# Patient Record
Sex: MALE | Race: WHITE | HISPANIC OR LATINO | Employment: UNEMPLOYED | URBAN - METROPOLITAN AREA
[De-identification: names, ages, dates, MRNs, and addresses within clinical notes are randomized per-mention and may not be internally consistent; named-entity substitution may affect disease eponyms.]

---

## 2024-06-24 ENCOUNTER — HOSPITAL ENCOUNTER (INPATIENT)
Facility: HOSPITAL | Age: 59
LOS: 1 days | Discharge: HOME | DRG: 948 | End: 2024-06-25
Attending: EMERGENCY MEDICINE | Admitting: EMERGENCY MEDICINE
Payer: MEDICAID

## 2024-06-24 DIAGNOSIS — C79.9 METASTATIC MALIGNANT NEOPLASM, UNSPECIFIED SITE: Primary | ICD-10-CM

## 2024-06-24 DIAGNOSIS — R06.02 SHORTNESS OF BREATH: ICD-10-CM

## 2024-06-24 DIAGNOSIS — R07.9 CHEST PAIN: ICD-10-CM

## 2024-06-24 PROBLEM — C78.7 METASTATIC COLON CANCER TO LIVER: Status: ACTIVE | Noted: 2024-06-24

## 2024-06-24 PROBLEM — C18.9 METASTATIC COLON CANCER TO LIVER: Status: ACTIVE | Noted: 2024-06-24

## 2024-06-24 PROBLEM — E87.5 HYPERKALEMIA: Status: ACTIVE | Noted: 2024-06-24

## 2024-06-24 LAB
ALBUMIN SERPL BCP-MCNC: 2.2 G/DL (ref 3.5–5.2)
ALP SERPL-CCNC: 1076 U/L (ref 55–135)
ALT SERPL W/O P-5'-P-CCNC: 103 U/L (ref 10–44)
AMMONIA PLAS-SCNC: 70 UMOL/L (ref 10–50)
ANION GAP SERPL CALC-SCNC: 12 MMOL/L (ref 8–16)
AST SERPL-CCNC: 112 U/L (ref 10–40)
BASOPHILS # BLD AUTO: 0.06 K/UL (ref 0–0.2)
BASOPHILS NFR BLD: 0.4 % (ref 0–1.9)
BILIRUB SERPL-MCNC: 7.1 MG/DL (ref 0.1–1)
BILIRUB UR QL STRIP: ABNORMAL
BNP SERPL-MCNC: 14 PG/ML (ref 0–99)
BUN SERPL-MCNC: 13 MG/DL (ref 6–20)
CALCIUM SERPL-MCNC: 9.4 MG/DL (ref 8.7–10.5)
CHLORIDE SERPL-SCNC: 92 MMOL/L (ref 95–110)
CLARITY UR REFRACT.AUTO: CLEAR
CO2 SERPL-SCNC: 27 MMOL/L (ref 23–29)
COLOR UR AUTO: YELLOW
CREAT SERPL-MCNC: 0.7 MG/DL (ref 0.5–1.4)
DIFFERENTIAL METHOD BLD: ABNORMAL
EOSINOPHIL # BLD AUTO: 0.5 K/UL (ref 0–0.5)
EOSINOPHIL NFR BLD: 3.2 % (ref 0–8)
ERYTHROCYTE [DISTWIDTH] IN BLOOD BY AUTOMATED COUNT: 18.5 % (ref 11.5–14.5)
EST. GFR  (NO RACE VARIABLE): >60 ML/MIN/1.73 M^2
GLUCOSE SERPL-MCNC: 146 MG/DL (ref 70–110)
GLUCOSE UR QL STRIP: NEGATIVE
HCT VFR BLD AUTO: 32.9 % (ref 40–54)
HCV AB SERPL QL IA: NORMAL
HGB BLD-MCNC: 10.9 G/DL (ref 14–18)
HGB UR QL STRIP: ABNORMAL
HIV 1+2 AB+HIV1 P24 AG SERPL QL IA: NORMAL
IMM GRANULOCYTES # BLD AUTO: 0.16 K/UL (ref 0–0.04)
IMM GRANULOCYTES NFR BLD AUTO: 1.1 % (ref 0–0.5)
INR PPP: 1.2 (ref 0.8–1.2)
KETONES UR QL STRIP: NEGATIVE
LEUKOCYTE ESTERASE UR QL STRIP: NEGATIVE
LIPASE SERPL-CCNC: 15 U/L (ref 4–60)
LYMPHOCYTES # BLD AUTO: 1.1 K/UL (ref 1–4.8)
LYMPHOCYTES NFR BLD: 7.8 % (ref 18–48)
MCH RBC QN AUTO: 27.5 PG (ref 27–31)
MCHC RBC AUTO-ENTMCNC: 33.1 G/DL (ref 32–36)
MCV RBC AUTO: 83 FL (ref 82–98)
MONOCYTES # BLD AUTO: 1.3 K/UL (ref 0.3–1)
MONOCYTES NFR BLD: 8.8 % (ref 4–15)
NEUTROPHILS # BLD AUTO: 11.5 K/UL (ref 1.8–7.7)
NEUTROPHILS NFR BLD: 78.7 % (ref 38–73)
NITRITE UR QL STRIP: NEGATIVE
NRBC BLD-RTO: 0 /100 WBC
OHS QRS DURATION: 80 MS
OHS QTC CALCULATION: 449 MS
PH UR STRIP: 6 [PH] (ref 5–8)
PLATELET # BLD AUTO: 402 K/UL (ref 150–450)
PMV BLD AUTO: 9.7 FL (ref 9.2–12.9)
POTASSIUM SERPL-SCNC: 5.2 MMOL/L (ref 3.5–5.1)
PROT SERPL-MCNC: 8.3 G/DL (ref 6–8.4)
PROT UR QL STRIP: ABNORMAL
PROTHROMBIN TIME: 12.9 SEC (ref 9–12.5)
RBC # BLD AUTO: 3.96 M/UL (ref 4.6–6.2)
SODIUM SERPL-SCNC: 131 MMOL/L (ref 136–145)
SP GR UR STRIP: 1.02 (ref 1–1.03)
TROPONIN I SERPL DL<=0.01 NG/ML-MCNC: <0.006 NG/ML (ref 0–0.03)
URN SPEC COLLECT METH UR: ABNORMAL
WBC # BLD AUTO: 14.55 K/UL (ref 3.9–12.7)

## 2024-06-24 PROCEDURE — 85610 PROTHROMBIN TIME: CPT | Performed by: EMERGENCY MEDICINE

## 2024-06-24 PROCEDURE — 85025 COMPLETE CBC W/AUTO DIFF WBC: CPT

## 2024-06-24 PROCEDURE — 84484 ASSAY OF TROPONIN QUANT: CPT

## 2024-06-24 PROCEDURE — 93005 ELECTROCARDIOGRAM TRACING: CPT

## 2024-06-24 PROCEDURE — 11000001 HC ACUTE MED/SURG PRIVATE ROOM

## 2024-06-24 PROCEDURE — 83880 ASSAY OF NATRIURETIC PEPTIDE: CPT

## 2024-06-24 PROCEDURE — 96374 THER/PROPH/DIAG INJ IV PUSH: CPT

## 2024-06-24 PROCEDURE — 25500020 PHARM REV CODE 255: Performed by: EMERGENCY MEDICINE

## 2024-06-24 PROCEDURE — 63600175 PHARM REV CODE 636 W HCPCS

## 2024-06-24 PROCEDURE — 25000003 PHARM REV CODE 250: Performed by: INTERNAL MEDICINE

## 2024-06-24 PROCEDURE — 21400001 HC TELEMETRY ROOM

## 2024-06-24 PROCEDURE — 87389 HIV-1 AG W/HIV-1&-2 AB AG IA: CPT | Performed by: PHYSICIAN ASSISTANT

## 2024-06-24 PROCEDURE — 99285 EMERGENCY DEPT VISIT HI MDM: CPT | Mod: 25

## 2024-06-24 PROCEDURE — 82140 ASSAY OF AMMONIA: CPT | Performed by: EMERGENCY MEDICINE

## 2024-06-24 PROCEDURE — 83690 ASSAY OF LIPASE: CPT | Performed by: EMERGENCY MEDICINE

## 2024-06-24 PROCEDURE — 96361 HYDRATE IV INFUSION ADD-ON: CPT

## 2024-06-24 PROCEDURE — 93010 ELECTROCARDIOGRAM REPORT: CPT | Mod: ,,, | Performed by: INTERNAL MEDICINE

## 2024-06-24 PROCEDURE — 86803 HEPATITIS C AB TEST: CPT | Performed by: PHYSICIAN ASSISTANT

## 2024-06-24 PROCEDURE — 80053 COMPREHEN METABOLIC PANEL: CPT

## 2024-06-24 PROCEDURE — 81003 URINALYSIS AUTO W/O SCOPE: CPT | Performed by: EMERGENCY MEDICINE

## 2024-06-24 RX ORDER — LOSARTAN POTASSIUM 25 MG/1
1 TABLET ORAL DAILY
Status: ON HOLD | COMMUNITY
Start: 2024-04-18 | End: 2024-06-25 | Stop reason: HOSPADM

## 2024-06-24 RX ORDER — OMEPRAZOLE 20 MG/1
CAPSULE, DELAYED RELEASE ORAL
COMMUNITY
Start: 2024-05-20

## 2024-06-24 RX ORDER — IBUPROFEN 200 MG
24 TABLET ORAL
Status: DISCONTINUED | OUTPATIENT
Start: 2024-06-24 | End: 2024-06-24

## 2024-06-24 RX ORDER — DEXTROSE 40 %
16 GEL (GRAM) ORAL
Status: DISCONTINUED | OUTPATIENT
Start: 2024-06-24 | End: 2024-06-25 | Stop reason: HOSPADM

## 2024-06-24 RX ORDER — GLUCAGON 1 MG
1 KIT INJECTION
Status: DISCONTINUED | OUTPATIENT
Start: 2024-06-24 | End: 2024-06-25 | Stop reason: HOSPADM

## 2024-06-24 RX ORDER — ERGOCALCIFEROL 1.25 MG/1
1 CAPSULE ORAL
COMMUNITY
Start: 2024-05-16

## 2024-06-24 RX ORDER — IBUPROFEN 200 MG
16 TABLET ORAL
Status: DISCONTINUED | OUTPATIENT
Start: 2024-06-24 | End: 2024-06-24

## 2024-06-24 RX ORDER — METFORMIN HYDROCHLORIDE 500 MG/1
500 TABLET ORAL 2 TIMES DAILY WITH MEALS
COMMUNITY
Start: 2024-05-16

## 2024-06-24 RX ORDER — METHYLPREDNISOLONE 4 MG/1
TABLET ORAL
COMMUNITY
Start: 2024-04-18 | End: 2024-06-24

## 2024-06-24 RX ORDER — MORPHINE SULFATE 15 MG/1
15 TABLET, FILM COATED, EXTENDED RELEASE ORAL 2 TIMES DAILY
Status: ON HOLD | COMMUNITY
Start: 2024-06-07 | End: 2024-06-25

## 2024-06-24 RX ORDER — ATORVASTATIN CALCIUM 10 MG/1
1 TABLET, FILM COATED ORAL DAILY
Status: ON HOLD | COMMUNITY
Start: 2024-04-18 | End: 2024-06-25 | Stop reason: HOSPADM

## 2024-06-24 RX ORDER — NALOXONE HCL 0.4 MG/ML
0.02 VIAL (ML) INJECTION
Status: DISCONTINUED | OUTPATIENT
Start: 2024-06-24 | End: 2024-06-25 | Stop reason: HOSPADM

## 2024-06-24 RX ORDER — MORPHINE SULFATE 15 MG/1
15 TABLET, FILM COATED, EXTENDED RELEASE ORAL 2 TIMES DAILY
Status: DISCONTINUED | OUTPATIENT
Start: 2024-06-24 | End: 2024-06-25 | Stop reason: HOSPADM

## 2024-06-24 RX ORDER — MORPHINE SULFATE 2 MG/ML
6 INJECTION, SOLUTION INTRAMUSCULAR; INTRAVENOUS
Status: COMPLETED | OUTPATIENT
Start: 2024-06-24 | End: 2024-06-24

## 2024-06-24 RX ORDER — CYCLOBENZAPRINE HYDROCHLORIDE 7.5 MG/1
TABLET, FILM COATED ORAL
COMMUNITY
Start: 2024-05-01

## 2024-06-24 RX ORDER — HYDROMORPHONE HYDROCHLORIDE 1 MG/ML
2 INJECTION, SOLUTION INTRAMUSCULAR; INTRAVENOUS; SUBCUTANEOUS EVERY 6 HOURS PRN
Status: DISCONTINUED | OUTPATIENT
Start: 2024-06-24 | End: 2024-06-25

## 2024-06-24 RX ORDER — OXYCODONE HYDROCHLORIDE 10 MG/1
10 TABLET ORAL EVERY 8 HOURS PRN
Status: ON HOLD | COMMUNITY
Start: 2024-06-06 | End: 2024-06-25

## 2024-06-24 RX ORDER — SODIUM CHLORIDE 0.9 % (FLUSH) 0.9 %
10 SYRINGE (ML) INJECTION EVERY 12 HOURS PRN
Status: DISCONTINUED | OUTPATIENT
Start: 2024-06-24 | End: 2024-06-25 | Stop reason: HOSPADM

## 2024-06-24 RX ORDER — DICYCLOMINE HYDROCHLORIDE 20 MG/1
TABLET ORAL
COMMUNITY
Start: 2024-05-01

## 2024-06-24 RX ORDER — ONDANSETRON HYDROCHLORIDE 2 MG/ML
4 INJECTION, SOLUTION INTRAVENOUS EVERY 8 HOURS PRN
Status: DISCONTINUED | OUTPATIENT
Start: 2024-06-24 | End: 2024-06-25 | Stop reason: HOSPADM

## 2024-06-24 RX ORDER — LIDOCAINE 50 MG/G
1 PATCH TOPICAL DAILY
COMMUNITY
Start: 2024-06-11

## 2024-06-24 RX ORDER — DEXTROSE 40 %
24 GEL (GRAM) ORAL
Status: DISCONTINUED | OUTPATIENT
Start: 2024-06-24 | End: 2024-06-25 | Stop reason: HOSPADM

## 2024-06-24 RX ADMIN — MORPHINE SULFATE 6 MG: 2 INJECTION, SOLUTION INTRAMUSCULAR; INTRAVENOUS at 08:06

## 2024-06-24 RX ADMIN — SODIUM CHLORIDE, POTASSIUM CHLORIDE, SODIUM LACTATE AND CALCIUM CHLORIDE 1000 ML: 600; 310; 30; 20 INJECTION, SOLUTION INTRAVENOUS at 08:06

## 2024-06-24 RX ADMIN — IOHEXOL 75 ML: 350 INJECTION, SOLUTION INTRAVENOUS at 09:06

## 2024-06-24 RX ADMIN — MORPHINE SULFATE 15 MG: 15 TABLET, EXTENDED RELEASE ORAL at 08:06

## 2024-06-24 NOTE — CONSULTS
[facility] Consult Note    Attending: Caleb Dougherty MD   Admit Date: 6/24/2024  Today's Date: 06/24/2024    SUBJECTIVE:     HPI:  58-year-old gentleman past medical history of recently diagnosed metastatic colon cancer who presents to [facility] for a second opinion for biliary obstruction from metastatic cancer. CT AP with contrast showed multiple large liver lesions, left sided IH ductal dilation, descending colonic wall thickening compatible with primary colonic mass. Initial labs - WBC 14k, Hb 10.9, INR 1.2, T bili 7.1, , , and ALP 1076.      A CT incidentally showed colon mass. CEA > 10,000. PET scan shows primary proximal left colonic neoplasm with possible additional synchronous neoplasm along the sigmoid colon versus metastatic sigmoid tissue deposits/implant as well as wide metastasis in his liver. Lymph nodes in mesentery. He underwent biopsy of the liver lesions which showed colonic adenocarcinoma. Received radiation therapy.  [place] [facility] [place]. MRCP showed did not show an area amenable to stenting. Seen by Heme-Onc who recommended hospice.     Most Recent Endoscopic Procedures:   None         All home medications reviewed.    Review of Systems   Constitutional:  Positive for weight loss. Negative for chills and fever.   Eyes:         Yellowing of eyes   Gastrointestinal:  Negative for abdominal pain and blood in stool.   Skin:         Yellowing       OBJECTIVE:     Vital Signs Trends/History Reviewed  Vitals:    06/24/24 0900 06/24/24 1030 06/24/24 1130 06/24/24 1200   BP: 124/65 123/79 131/75 (!) 129/92   BP Location: Left arm Left arm Left arm Left arm   Patient Position: Lying Lying Lying Lying   Pulse: (!) 117 (!) 118 108 (!) 112   Resp: 18 20 12 20   Temp:       TempSrc:       SpO2: 97% 97% 100% 100%       Physical Exam  Constitutional:       General: He is not in acute distress.     Appearance: He is ill-appearing.   HENT:       Head: Normocephalic and atraumatic.   Eyes:      General: Scleral icterus present.   Abdominal:      General: Abdomen is flat.      Palpations: Abdomen is soft.      Tenderness: There is no abdominal tenderness. There is no guarding or rebound.   Musculoskeletal:      Cervical back: Normal range of motion and neck supple.   Skin:     General: Skin is warm and dry.      Coloration: Skin is jaundiced.   Neurological:      General: No focal deficit present.      Mental Status: He is alert and oriented to person, place, and time. Mental status is at baseline.   Psychiatric:         Behavior: Behavior normal.         Thought Content: Thought content normal.         Judgment: Judgment normal.      Comments: Distraught          ASSESSMENT:      58-year-old gentleman past medical history of recently diagnosed metastatic colon cancer who presents to [facility] for a second opinion for biliary obstruction from metastatic cancer. CT AP with contrast showed multiple large liver lesions, left sided IH ductal dilation, descending colonic wall thickening compatible with primary colonic mass. Was deemed not to be a candidate for [place]CP [place] [facility] and patient initially went hospice.  AES consulted for possible ERCP with stenting.     #metastatic colon cancer with liver mets          RECOMMENDATIONS:    - Discussed the imaging findings with Dr. KAMRON Yang. I explained to patient and family.that most of his liver was replaced by mets, which was driving hyperbilirubinemia. Explained that ERCP w stenting unlikely to be of much benefit and carries the risk of infection. Ultimately, his prognosis is poor. The family and patient were distraught. But they accepted his poor prognosis and wished to speak to palliative care.  I communicated with the primary team, Oncology, and Palliative Care. Appreciate their recs.     Thank you for allowing us to participate in the care of this patient. Please call with questions. We will sign off.      Ru Herrera MD , PGY-V  Gastroenterology Fellow  [facility]

## 2024-06-24 NOTE — ASSESSMENT & PLAN NOTE
As mentioned above, laure came to [facility] for 2nd opinion though patient and relatives understands poor prognosis.  We will continue home pain meds, p.r.n. Dilaudid.  Spoke with patient and family, though probably not much to add, we will reach out to oncology

## 2024-06-24 NOTE — ED PROVIDER NOTES
Source of History:  Patient and chart    Chief complaint:  Multiple Complaints (Pt reports to the ED with c/o SOB, lower abdominal pain, N/V, generalized weakness x2 weeks. Pt reports colon CA, not currently on chemo. )      HPI:  Diego Mckeon is a 58 y.o. male with a medical history as below presents to the emergency department with a chief complaint of abdominal pain and shortness of breath secondary to metastatic colon cancer.  Patient has been being seen at [facility] where he was diagnosed with metastatic cancer and recommended to the urgent home hospice.  Family members wanted a 2nd opinion so they brought the patient to our facility pending further discussion with MD Navarrete who is considering the patient for treatment.  Patient was shortness of breath I am on over the last couple of nights.  It is nonexertional.  Abdominal pain she was more chronic in nature.  He was recently discharged after receiving MRCP at [facility] on the 20th.  He denies fever, chills, diarrhea, urinary symptoms, nausea or any other symptoms at this time.    Review of patient's allergies indicates:   Allergen Reactions    Asa [aspirin]        No current facility-administered medications on file prior to encounter.     Current Outpatient Medications on File Prior to Encounter   Medication Sig Dispense Refill    atorvastatin (LIPITOR) 10 MG tablet Take 1 tablet by mouth once daily.      cyclobenzaprine (FEXMID) 7.5 MG Tab TAKE 1 Tablet BY MOUTH THREE TIMES DAILY AS NEEDED FOR lower BACK pain      dicyclomine (BENTYL) 20 mg tablet TAKE 1 Tablet BY MOUTH FOUR TIMES DAILY FOR lower abdominal pain      ergocalciferol (ERGOCALCIFEROL) 50,000 unit Cap Take 1 capsule by mouth every 7 days. On Saturday.      LIDOcaine (LIDODERM) 5 % Place 1 patch onto the skin once daily. To lower back.      losartan (COZAAR) 25 MG tablet Take 1 tablet by mouth once daily.      metFORMIN (GLUCOPHAGE) 500 MG tablet Take 500 mg by mouth 2 (two) times daily with meals.       morphine (MS CONTIN) 15 MG 12 hr tablet Take 15 mg by mouth 2 (two) times daily.      omeprazole (PRILOSEC) 20 MG capsule TAKE 1 Capsule BY MOUTH EVERY DAY 30 minutes TO 1 hour BEFORE A MEAL      oxyCODONE (ROXICODONE) 10 mg Tab immediate release tablet Take 10 mg by mouth every 8 (eight) hours as needed for Pain.      hydrocortisone-pramoxine (PROCTOFOAM-HS) rectal foam Place 1 applicator rectally every 8 (eight) hours as needed. (Patient not taking: Reported on 6/24/2024)      [DISCONTINUED] methylPREDNISolone (MEDROL DOSEPACK) 4 mg tablet follow package directions         PMH:  As per HPI and below:  No past medical history on file.  No past surgical history on file.    Social History     Socioeconomic History    Marital status:      Social Determinants of Health     Food Insecurity: No Food Insecurity (6/20/2024)    Received [facility]    Hunger Vital Sign     Worried About Running Out of Food in the Last Year: Never true     Ran Out of Food in the Last Year: Never true   Transportation Needs: No Transportation Needs (6/20/2024)    [facility]     Lack of Transportation (Medical): No     Lack of Transportation (Non-Medical): No       No family history on file.    Physical Exam:      Vitals:    06/24/24 1200   BP: (!) 129/92   Pulse: (!) 112   Resp: 20   Temp:      Physical Exam  Gen:  Tachycardic but otherwise stable  Mental Status:  Alert and oriented x3.  Appropriate conversant  Skin: Warm, dry. No rashes seen.  Slight jaundice  Eyes: No conjunctival injection.  minimal scleral icterus  Pulm: CTAB. No increased work of breathing.  No significant tachypnea.  No conversational dyspnea.    CV: Regular rate.   Abd: Soft.  Not distended.  Nontender.   MSK: No deformities.  +1 pitting edema lower extremities bilaterally  Neuro: Awake. Speech normal. No focal neuro deficit observed.     Procedures  Laboratory Studies:  Labs Reviewed   CBC W/ AUTO DIFFERENTIAL  - Abnormal; Notable for the following components:       Result Value    WBC 14.55 (*)     RBC 3.96 (*)     Hemoglobin 10.9 (*)     Hematocrit 32.9 (*)     RDW 18.5 (*)     Immature Granulocytes 1.1 (*)     Gran # (ANC) 11.5 (*)     Immature Grans (Abs) 0.16 (*)     Mono # 1.3 (*)     Gran % 78.7 (*)     Lymph % 7.8 (*)     All other components within normal limits   COMPREHENSIVE METABOLIC PANEL - Abnormal; Notable for the following components:    Sodium 131 (*)     Potassium 5.2 (*)     Chloride 92 (*)     Glucose 146 (*)     Albumin 2.2 (*)     Total Bilirubin 7.1 (*)     Alkaline Phosphatase 1,076 (*)      (*)      (*)     All other components within normal limits   PROTIME-INR - Abnormal; Notable for the following components:    Prothrombin Time 12.9 (*)     All other components within normal limits   AMMONIA - Abnormal; Notable for the following components:    Ammonia 70 (*)     All other components within normal limits   URINALYSIS, REFLEX TO URINE CULTURE - Abnormal; Notable for the following components:    Protein, UA Trace (*)     Bilirubin (UA) 2+ (*)     Occult Blood UA Trace (*)     All other components within normal limits    Narrative:     Specimen Source->Urine   HIV 1 / 2 ANTIBODY    Narrative:     Release to patient->Immediate   HEPATITIS C ANTIBODY    Narrative:     Release to patient->Immediate   TROPONIN I   B-TYPE NATRIURETIC PEPTIDE   LIPASE       EKG (independently interpreted by me):  Sinus tachycardia with a rate of 117.  Right axis deviation.  Normal intervals.  No STEMI    X-rays (independently interpreted by me):  No apparent consolidation    Chart reviewed.  Patient was recently admitted to [facility] where he was sent by his oncologist concerns over biliary obstruction.  He was discharged to home hospice.    Imaging Results              X-Ray Chest AP Portable (Final result)  Result time 06/24/24 10:45:31      Final result by Andrea Celis MD (06/24/24 10:45:31)                    Impression:      See above      Electronically signed by: Andrea Celis MD  Date:    06/24/2024  Time:    10:45               Narrative:    EXAMINATION:  XR CHEST AP PORTABLE    CLINICAL HISTORY:  Chest Pain;    TECHNIQUE:  Single frontal view of the chest was performed.    COMPARISON:  N 06/24/2024 CT chest one    FINDINGS:  Central venous catheter in the SVC.  Heart size normal.  CT scan demonstrated multiple small pulmonary nodules.  These are not well demonstrated on this study.  The lungs are clear.  No pleural effusion.                                       CTA Chest Non-Coronary (PE Studies) (Final result)  Result time 06/24/24 10:40:02      Final result by Sonido Callahan IV, MD (06/24/24 10:40:02)                   Impression:      No convincing evidence of pulmonary thromboembolism.    Primary colonic mass in the descending colon with local extension of disease along the mesenteric root and surrounding peritoneal soft tissue nodules/tumor deposits.    Extensive hepatic metastasis noting areas of intrahepatic biliary duct dilatation, most prominent in the left hepatic lobe.  Additional probable upper abdominal metastatic lymph nodes.    Mesenteric edema and trace ascites.    Scattered subcentimeter pulmonary nodules, nonspecific.    Additional findings as above.      Electronically signed by: Sonido Callahan  Date:    06/24/2024  Time:    10:40               Narrative:    EXAMINATION:  CTA CHEST NON CORONARY (PE STUDIES); CT ABDOMEN PELVIS WITH IV CONTRAST    CLINICAL HISTORY:  Pulmonary embolism (PE) suspected, unknown D-dimer;Short of breath;; metastatic disease with abdominal pain;    TECHNIQUE:  CTA chest PE protocol and routine CT abdomen/pelvis performed after administration of 75 mL IV Omnipaque 350.  Coronal sagittal reformats provided.  MIPS also provided.    COMPARISON:  None    FINDINGS:  Right-sided chest port in place with catheter tip terminating at cavoatrial junction.  No  suspicious axillary lymphadenopathy.    Left-sided 3 vessel aortic arch.  No significant atherosclerotic plaque.  No aneurysm.    Heart is normal in size.  No significant pericardial fluid.    Pulmonary arteries and veins distribute normally.  Assessment of pulmonary arteries is degraded due to respiratory motion.  No convincing pulmonary arterial filling defect.    No suspicious mediastinal or hilar lymphadenopathy.    Large airways are patent.  Scattered bandlike opacities most compatible with subsegmental atelectasis.  Several scattered bilateral subcentimeter pulmonary nodules, largest measuring 6 mm in right middle lobe with abutment of pleura (series 2, image 254).    Liver is enlarged with numerous hypoattenuating masses throughout both lobes, compatible with reported metastatic colon cancer.  For example, lesion measuring 7.0 x 6.9 cm (series 3, image 72).    Gallbladder is nondistended.  No definite cholelithiasis.  Scattered areas of intrahepatic biliary duct dilatation, most prominent the left hepatic lobe, and likely related to obstructing masses.  No extrahepatic biliary duct dilatation.    Enlarged periportal lymph nodes with heterogeneous appearance.  For example lesion measures 1.7 cm in short axis (series 3, image 73).    Pancreas, spleen, bilateral adrenal glands are unremarkable.    Bowel kidneys are normal in size and location.  Normal symmetric uptake of contrast.  Subcentimeter hypodensity in right kidney, too small to characterize, likely a cyst.  No hydronephrosis or hydroureter.    Bladder is incompletely distended.  No focal wall thickening.  Prostate is upper limit of normal for size.    Stomach and duodenum are unremarkable.  Normal caliber small bowel.    Irregular wall thickening enhancement of descending colon, spanning 6 cm in craniocaudal dimension, compatible with primary colonic mass (series 3, image 83).  There is surrounding soft tissue nodularity suggesting local extension of  disease/metastasis.  This includes prominent 4.1 cm heterogeneous soft tissue deposit in the left anterolateral abdomen (series 3, image 105).  Nodular soft tissue extending for primary tumor along mesenteric root (series 3, image 89).  Additional prominent mesenteric soft tissue lesion measures 1.3 cm (series 3, image 81).    Moderate volume colonic stool burden without evidence of high-grade obstruction.  No free intraperitoneal air.  Mesenteric edema.  Trace ascites.    Abdominal aorta demonstrates normal caliber and course.  No significant atherosclerotic plaque.  Main portal vein, splenic vein, and SMV are patent.  Of note there is soft narrowing of main portal vein at the reese hepatis related to mass effect from caudate lesion.    No acute fracture.  No aggressive osseous lesion.                                       CT Abdomen Pelvis With IV Contrast NO Oral Contrast (Final result)  Result time 06/24/24 10:40:02      Final result by Sonido Callahan IV, MD (06/24/24 10:40:02)                   Impression:      No convincing evidence of pulmonary thromboembolism.    Primary colonic mass in the descending colon with local extension of disease along the mesenteric root and surrounding peritoneal soft tissue nodules/tumor deposits.    Extensive hepatic metastasis noting areas of intrahepatic biliary duct dilatation, most prominent in the left hepatic lobe.  Additional probable upper abdominal metastatic lymph nodes.    Mesenteric edema and trace ascites.    Scattered subcentimeter pulmonary nodules, nonspecific.    Additional findings as above.      Electronically signed by: Sonido Callahan  Date:    06/24/2024  Time:    10:40               Narrative:    EXAMINATION:  CTA CHEST NON CORONARY (PE STUDIES); CT ABDOMEN PELVIS WITH IV CONTRAST    CLINICAL HISTORY:  Pulmonary embolism (PE) suspected, unknown D-dimer;Short of breath;; metastatic disease with abdominal pain;    TECHNIQUE:  CTA chest PE protocol and  routine CT abdomen/pelvis performed after administration of 75 mL IV Omnipaque 350.  Coronal sagittal reformats provided.  MIPS also provided.    COMPARISON:  None    FINDINGS:  Right-sided chest port in place with catheter tip terminating at cavoatrial junction.  No suspicious axillary lymphadenopathy.    Left-sided 3 vessel aortic arch.  No significant atherosclerotic plaque.  No aneurysm.    Heart is normal in size.  No significant pericardial fluid.    Pulmonary arteries and veins distribute normally.  Assessment of pulmonary arteries is degraded due to respiratory motion.  No convincing pulmonary arterial filling defect.    No suspicious mediastinal or hilar lymphadenopathy.    Large airways are patent.  Scattered bandlike opacities most compatible with subsegmental atelectasis.  Several scattered bilateral subcentimeter pulmonary nodules, largest measuring 6 mm in right middle lobe with abutment of pleura (series 2, image 254).    Liver is enlarged with numerous hypoattenuating masses throughout both lobes, compatible with reported metastatic colon cancer.  For example, lesion measuring 7.0 x 6.9 cm (series 3, image 72).    Gallbladder is nondistended.  No definite cholelithiasis.  Scattered areas of intrahepatic biliary duct dilatation, most prominent the left hepatic lobe, and likely related to obstructing masses.  No extrahepatic biliary duct dilatation.    Enlarged periportal lymph nodes with heterogeneous appearance.  For example lesion measures 1.7 cm in short axis (series 3, image 73).    Pancreas, spleen, bilateral adrenal glands are unremarkable.    Bowel kidneys are normal in size and location.  Normal symmetric uptake of contrast.  Subcentimeter hypodensity in right kidney, too small to characterize, likely a cyst.  No hydronephrosis or hydroureter.    Bladder is incompletely distended.  No focal wall thickening.  Prostate is upper limit of normal for size.    Stomach and duodenum are unremarkable.   Normal caliber small bowel.    Irregular wall thickening enhancement of descending colon, spanning 6 cm in craniocaudal dimension, compatible with primary colonic mass (series 3, image 83).  There is surrounding soft tissue nodularity suggesting local extension of disease/metastasis.  This includes prominent 4.1 cm heterogeneous soft tissue deposit in the left anterolateral abdomen (series 3, image 105).  Nodular soft tissue extending for primary tumor along mesenteric root (series 3, image 89).  Additional prominent mesenteric soft tissue lesion measures 1.3 cm (series 3, image 81).    Moderate volume colonic stool burden without evidence of high-grade obstruction.  No free intraperitoneal air.  Mesenteric edema.  Trace ascites.    Abdominal aorta demonstrates normal caliber and course.  No significant atherosclerotic plaque.  Main portal vein, splenic vein, and SMV are patent.  Of note there is soft narrowing of main portal vein at the reese hepatis related to mass effect from caudate lesion.    No acute fracture.  No aggressive osseous lesion.                                      Medications Given:  Medications   sodium chloride 0.9% flush 10 mL (has no administration in time range)   naloxone 0.4 mg/mL injection 0.02 mg (has no administration in time range)   glucagon (human recombinant) injection 1 mg (has no administration in time range)   ondansetron injection 4 mg (has no administration in time range)   dextrose 10% bolus 125 mL 125 mL (has no administration in time range)   dextrose 10% bolus 250 mL 250 mL (has no administration in time range)   morphine 12 hr tablet 15 mg (has no administration in time range)   dextrose 40 % gel 16,000 mg (has no administration in time range)   dextrose 40 % gel 24,000 mg (has no administration in time range)   HYDROmorphone injection 2 mg (has no administration in time range)   morphine injection 6 mg (6 mg Intravenous Given 6/24/24 1184)   lactated ringers bolus 1,000 mL  (0 mLs Intravenous Stopped 6/24/24 1016)   iohexoL (OMNIPAQUE 350) injection 75 mL (75 mLs Intravenous Given 6/24/24 0984)       Discussed with:  Hospital medicine.  Sai AYALA  Nerves further management    MDM:    58 y.o. male with chest and abdominal pain this setting of metastatic colon cancer    Differential includes but is not limited to pneumonia, ACS, worsening metastatic disease    EKGs reassuring, troponin and BNP within normal limits.  I think it is unlikely to be ACS.    Lipase within normal limits.  I think it is unlikely to be pancreatitis.    Patient significant for abnormalities of liver enzymes, elevated ammonia, worsening coag factors.  This all consistent with metastatic disease.  Patient was anemic as leukocytosis.  Uncertain etiology of his elevated white blood cell count.    We admitted the patient to Hospital Medicine for further management the symptoms and 2nd opinion of his metastatic disease.          Medical Decision Making  Amount and/or Complexity of Data Reviewed  Labs: ordered. Decision-making details documented in ED Course.  Radiology: ordered.    Risk  Prescription drug management.  Decision regarding hospitalization.         Diagnostic Impression:    1. Metastatic malignant neoplasm, unspecified site    2. Shortness of breath    3. Chest pain         ED Disposition Condition    Admit Stable               Patient and/or family understands the plan and is in agreement, verbalized understanding, questions answered    V Jaylen Junior MD  Resident  Emergency Medicine         Paul Junior MD  Resident  06/24/24 2621    Agree with resident note and plan of care.  I independently reviewed the triage note, vitals, and all ordered labs/rads.  The resident and I discussed the case and disposition.           Valentine Ramsey MD  06/24/24 8297

## 2024-06-24 NOTE — SUBJECTIVE & OBJECTIVE
No past medical history on file.    No past surgical history on file.    Review of patient's allergies indicates:   Allergen Reactions    Asa [aspirin]        No current facility-administered medications on file prior to encounter.     Current Outpatient Medications on File Prior to Encounter   Medication Sig    atorvastatin (LIPITOR) 10 MG tablet Take 1 tablet by mouth once daily.    cyclobenzaprine (FEXMID) 7.5 MG Tab TAKE 1 Tablet BY MOUTH THREE TIMES DAILY AS NEEDED FOR lower BACK pain    dicyclomine (BENTYL) 20 mg tablet TAKE 1 Tablet BY MOUTH FOUR TIMES DAILY FOR lower abdominal pain    ergocalciferol (ERGOCALCIFEROL) 50,000 unit Cap Take 1 capsule by mouth every 7 days. On Saturday.    LIDOcaine (LIDODERM) 5 % Place 1 patch onto the skin once daily. To lower back.    losartan (COZAAR) 25 MG tablet Take 1 tablet by mouth once daily.    metFORMIN (GLUCOPHAGE) 500 MG tablet Take 500 mg by mouth 2 (two) times daily with meals.    morphine (MS CONTIN) 15 MG 12 hr tablet Take 15 mg by mouth 2 (two) times daily.    omeprazole (PRILOSEC) 20 MG capsule TAKE 1 Capsule BY MOUTH EVERY DAY 30 minutes TO 1 hour BEFORE A MEAL    oxyCODONE (ROXICODONE) 10 mg Tab immediate release tablet Take 10 mg by mouth every 8 (eight) hours as needed for Pain.    hydrocortisone-pramoxine (PROCTOFOAM-HS) rectal foam Place 1 applicator rectally every 8 (eight) hours as needed. (Patient not taking: Reported on 6/24/2024)    [DISCONTINUED] methylPREDNISolone (MEDROL DOSEPACK) 4 mg tablet follow package directions     Family History    None       Tobacco Use    Smoking status: Not on file    Smokeless tobacco: Not on file   Substance and Sexual Activity    Alcohol use: Not on file    Drug use: Not on file    Sexual activity: Not on file     Review of Systems   Respiratory:  Positive for shortness of breath.    Cardiovascular:  Negative for chest pain.   Gastrointestinal:  Positive for abdominal pain.   Skin:         Jaundice     Objective:      Vital Signs (Most Recent):  Temp: 98.4 °F (36.9 °C) (06/24/24 0634)  Pulse: (!) 112 (06/24/24 1200)  Resp: 20 (06/24/24 1200)  BP: (!) 129/92 (06/24/24 1200)  SpO2: 100 % (06/24/24 1200) Vital Signs (24h Range):  Temp:  [98.4 °F (36.9 °C)] 98.4 °F (36.9 °C)  Pulse:  [108-122] 112  Resp:  [12-20] 20  SpO2:  [96 %-100 %] 100 %  BP: (115-131)/(65-92) 129/92        There is no height or weight on file to calculate BMI.     Physical Exam  Constitutional:       Appearance: He is ill-appearing.   HENT:      Head: Normocephalic.      Right Ear: External ear normal.      Left Ear: External ear normal.      Nose: Nose normal.   Eyes:      General: Scleral icterus present.   Cardiovascular:      Rate and Rhythm: Normal rate.   Pulmonary:      Effort: Pulmonary effort is normal.   Abdominal:      Palpations: Abdomen is soft.      Tenderness: There is no abdominal tenderness.   Musculoskeletal:      Right lower leg: Edema present.      Left lower leg: Edema present.   Skin:     General: Skin is warm.   Neurological:      General: No focal deficit present.      Mental Status: He is alert and oriented to person, place, and time.                Significant Labs: All pertinent labs within the past 24 hours have been reviewed.    Significant Imaging: I have reviewed all pertinent imaging results/findings within the past 24 hours.

## 2024-06-24 NOTE — ED TRIAGE NOTES
Pt arrived via ambulance w Nephew. May 24th CT scan and diagnosed with metastasis colon cancer. Reoccurring abdominal pain. SOB started 7 hours ago. June 5th pet scan showed spots on the lymph node and liver. Endorse mid sternal chest pin while taking deep breaths

## 2024-06-24 NOTE — CARE UPDATE
Per chart review and discussion with Heme-Onc team here.  We will opt to get AES consult.  Heme-Onc will see him as an outpatient

## 2024-06-24 NOTE — HPI
58-year-old gentleman past medical history of recently diagnosed metastatic colon cancer who had care at [facility] and was discharged home on home hospice presented to [facility] for shortness of breath, abdominal pain and a 2nd opinion.  Per chart review, patient was seen by his hematologist oncologist at [facility] on 06/20 were discussions was made about risk of doing chemotherapy.  Spoke with patient, wife and nephew.  They were dissatisfied with the lack of/poor communication at [facility] and wanted a 2nd opinion at [facility] though they understand that patient has a poor prognosis.  Patient at this time has no complaints.  CT abdomen pelvis done showed evidence of metastatic colon cancer with now pulmonary nodules.  Potassium of 5.2

## 2024-06-24 NOTE — H&P
[facility] - Emergency Dept  [facility]  History & Physical    Patient Name: Diego Mckeon  MRN: [id number]  Patient Class: IP- Inpatient  Admission Date: 6/24/2024  Attending Physician: Caleb Dougherty MD   Primary Care Provider: No primary care provider on file.         Patient information was obtained from patient, relative(s), past medical records, and ER records.     Subjective:     Principal Problem:<principal problem not specified>    Chief Complaint:   Chief Complaint   Patient presents with    Multiple Complaints     Pt reports to the ED with c/o SOB, lower abdominal pain, N/V, generalized weakness x2 weeks. Pt reports colon CA, not currently on chemo.         HPI: 58-year-old gentleman past medical history of recently diagnosed metastatic colon cancer who had care at [facility] and was discharged home on home hospice presented to [facility] for shortness of breath, abdominal pain and a 2nd opinion.  Per chart review, patient was seen by his hematologist oncologist at [facility] on 06/20 were discussions was made about risk of doing chemotherapy.  Spoke with patient, wife and nephew.  They were dissatisfied with the lack of/poor communication at [facility] and wanted a 2nd opinion at [facility] though they understand that patient has a poor prognosis.  Patient at this time has no complaints.  CT abdomen pelvis done showed evidence of metastatic colon cancer with now pulmonary nodules.  Potassium of 5.2    No past medical history on file.    No past surgical history on file.    Review of patient's allergies indicates:   Allergen Reactions    Asa [aspirin]        No current facility-administered medications on file prior to encounter.     Current Outpatient Medications on File Prior to Encounter   Medication Sig    atorvastatin (LIPITOR) 10 MG tablet Take 1 tablet by mouth once daily.    cyclobenzaprine (FEXMID) 7.5 MG Tab TAKE 1 Tablet BY MOUTH THREE TIMES DAILY AS NEEDED FOR lower BACK pain    dicyclomine (BENTYL) 20 mg tablet  TAKE 1 Tablet BY MOUTH FOUR TIMES DAILY FOR lower abdominal pain    ergocalciferol (ERGOCALCIFEROL) 50,000 unit Cap Take 1 capsule by mouth every 7 days. On Saturday.    LIDOcaine (LIDODERM) 5 % Place 1 patch onto the skin once daily. To lower back.    losartan (COZAAR) 25 MG tablet Take 1 tablet by mouth once daily.    metFORMIN (GLUCOPHAGE) 500 MG tablet Take 500 mg by mouth 2 (two) times daily with meals.    morphine (MS CONTIN) 15 MG 12 hr tablet Take 15 mg by mouth 2 (two) times daily.    omeprazole (PRILOSEC) 20 MG capsule TAKE 1 Capsule BY MOUTH EVERY DAY 30 minutes TO 1 hour BEFORE A MEAL    oxyCODONE (ROXICODONE) 10 mg Tab immediate release tablet Take 10 mg by mouth every 8 (eight) hours as needed for Pain.    hydrocortisone-pramoxine (PROCTOFOAM-HS) rectal foam Place 1 applicator rectally every 8 (eight) hours as needed. (Patient not taking: Reported on 6/24/2024)    [DISCONTINUED] methylPREDNISolone (MEDROL DOSEPACK) 4 mg tablet follow package directions     Family History    None       Tobacco Use    Smoking status: Not on file    Smokeless tobacco: Not on file   Substance and Sexual Activity    Alcohol use: Not on file    Drug use: Not on file    Sexual activity: Not on file     Review of Systems   Respiratory:  Positive for shortness of breath.    Cardiovascular:  Negative for chest pain.   Gastrointestinal:  Positive for abdominal pain.   Skin:         Jaundice     Objective:     Vital Signs (Most Recent):  Temp: 98.4 °F (36.9 °C) (06/24/24 0634)  Pulse: (!) 112 (06/24/24 1200)  Resp: 20 (06/24/24 1200)  BP: (!) 129/92 (06/24/24 1200)  SpO2: 100 % (06/24/24 1200) Vital Signs (24h Range):  Temp:  [98.4 °F (36.9 °C)] 98.4 °F (36.9 °C)  Pulse:  [108-122] 112  Resp:  [12-20] 20  SpO2:  [96 %-100 %] 100 %  BP: (115-131)/(65-92) 129/92        There is no height or weight on file to calculate BMI.     Physical Exam  Constitutional:       Appearance: He is ill-appearing.   HENT:      Head: Normocephalic.       Right Ear: External ear normal.      Left Ear: External ear normal.      Nose: Nose normal.   Eyes:      General: Scleral icterus present.   Cardiovascular:      Rate and Rhythm: Normal rate.   Pulmonary:      Effort: Pulmonary effort is normal.   Abdominal:      Palpations: Abdomen is soft.      Tenderness: There is no abdominal tenderness.   Musculoskeletal:      Right lower leg: Edema present.      Left lower leg: Edema present.   Skin:     General: Skin is warm.   Neurological:      General: No focal deficit present.      Mental Status: He is alert and oriented to person, place, and time.                Significant Labs: All pertinent labs within the past 24 hours have been reviewed.    Significant Imaging: I have reviewed all pertinent imaging results/findings within the past 24 hours.  Assessment/Plan:     Hyperkalemia  Potassium of 5.2, we will hold home losartan as blood pressures have been fine            Metastatic colon cancer to liver  As mentioned above, laure came to [facility] for 2nd opinion though patient and relatives understands poor prognosis.  We will continue home pain meds, p.r.n. Dilaudid.  Spoke with patient and family, though probably not much to add, we will reach out to oncology      VTE Risk Mitigation (From admission, onward)           Ordered     IP VTE LOW RISK PATIENT  Once         06/24/24 1221     Place sequential compression device  Until discontinued         06/24/24 1221                                    Caleb Dougherty MD  Department of Hospital Medicine  [facility] - Emergency Dept

## 2024-06-24 NOTE — PHARMACY MED REC
"      Admission Medication History     The home medication history was taken by Jj Winters.    You may go to "Admission" then "Reconcile Home Medications" tabs to review and/or act upon these items.     The home medication list has been updated by the Pharmacy department.   Please read ALL comments highlighted in yellow.   Please address this information as you see fit.    Feel free to contact us if you have any questions or require assistance.      The medications listed below were removed from the home medication list. Please reorder if appropriate:  Patient reports no longer taking the following medication(s):  METHYLPREDNISOLONE 4 MG DOSE PACK TABLET    Medications listed below were obtained from: Patient/family and Analytic software- [employer]  Current Outpatient Medications on File Prior to Encounter   Medication Sig    atorvastatin (LIPITOR) 10 MG tablet   Take 1 tablet by mouth once daily.    cyclobenzaprine (FEXMID) 7.5 MG Tab   Take 1 tablet by mouth three times daily as needed for lower back pain.    dicyclomine (BENTYL) 20 mg tablet   Take 1 tablet by mouth four times daily for lower abdominal pain.    ergocalciferol (ERGOCALCIFEROL) 50,000 unit Cap   Take 1 capsule by mouth every 7 days on Saturday.    LIDOcaine (LIDODERM) 5 %     Place 1 patch onto the skin once daily for lower back pain.    losartan (COZAAR) 25 MG tablet   Take 1 tablet by mouth once daily.    metFORMIN (GLUCOPHAGE) 500 MG tablet   Take 500 mg by mouth 2 (two) times daily with meals.    morphine (MS CONTIN) 15 MG 12 hr tablet   Take 15 mg by mouth 2 (two) times daily.    omeprazole (PRILOSEC) 20 MG capsule   Take 1 capsule by mouth once daily.    oxyCODONE (ROXICODONE) 10 mg Tab immediate release tablet   Take 10 mg by mouth every 8 (eight) hours as needed for pain.    hydrocortisone-pramoxine (PROCTOFOAM-HS) rectal foam   Place 1 applicator rectally every 8 (eight) hours as needed for hemorrhoidal pain.   (Patient not taking: " Reported on 6/24/2024)         Potential issues to be addressed PRIOR TO DISCHARGE  Patient requires education regarding drug therapies     Jj Winters  EXT [id number]            .

## 2024-06-24 NOTE — Clinical Note
Diagnosis: Shortness of breath [786.05.ICD-9-CM]   Future Attending Provider: FLORI DUBON [[id number]]   Reason for IP Medical Treatment  (Clinical interventions that can only be accomplished in the IP setting? ) :: metastatic disease and lab abnormalities   I certify that Inpatient services for greater than or equal to 2 midnights are medically necessary:: Yes   Plans for Post-Acute care--if anticipated (pick the single best option):: A. No post acute care anticipated at this time

## 2024-06-25 VITALS
OXYGEN SATURATION: 98 % | SYSTOLIC BLOOD PRESSURE: 109 MMHG | DIASTOLIC BLOOD PRESSURE: 76 MMHG | HEART RATE: 115 BPM | RESPIRATION RATE: 18 BRPM | TEMPERATURE: 98 F

## 2024-06-25 PROBLEM — Z51.5 PALLIATIVE CARE ENCOUNTER: Status: ACTIVE | Noted: 2024-06-25

## 2024-06-25 LAB
ANION GAP SERPL CALC-SCNC: 14 MMOL/L (ref 8–16)
BUN SERPL-MCNC: 14 MG/DL (ref 6–20)
CALCIUM SERPL-MCNC: 8.8 MG/DL (ref 8.7–10.5)
CHLORIDE SERPL-SCNC: 95 MMOL/L (ref 95–110)
CO2 SERPL-SCNC: 22 MMOL/L (ref 23–29)
CREAT SERPL-MCNC: 0.7 MG/DL (ref 0.5–1.4)
EST. GFR  (NO RACE VARIABLE): >60 ML/MIN/1.73 M^2
GLUCOSE SERPL-MCNC: 156 MG/DL (ref 70–110)
POTASSIUM SERPL-SCNC: 5.4 MMOL/L (ref 3.5–5.1)
SODIUM SERPL-SCNC: 131 MMOL/L (ref 136–145)

## 2024-06-25 PROCEDURE — 99223 1ST HOSP IP/OBS HIGH 75: CPT | Mod: ,,, | Performed by: HOSPITALIST

## 2024-06-25 PROCEDURE — 99223 1ST HOSP IP/OBS HIGH 75: CPT | Mod: ,,, | Performed by: INTERNAL MEDICINE

## 2024-06-25 PROCEDURE — 80048 BASIC METABOLIC PNL TOTAL CA: CPT | Performed by: INTERNAL MEDICINE

## 2024-06-25 PROCEDURE — 36415 COLL VENOUS BLD VENIPUNCTURE: CPT | Performed by: INTERNAL MEDICINE

## 2024-06-25 PROCEDURE — 25000003 PHARM REV CODE 250: Performed by: INTERNAL MEDICINE

## 2024-06-25 PROCEDURE — 94761 N-INVAS EAR/PLS OXIMETRY MLT: CPT

## 2024-06-25 RX ORDER — OXYCODONE HYDROCHLORIDE 10 MG/1
10 TABLET ORAL
Status: DISCONTINUED | OUTPATIENT
Start: 2024-06-25 | End: 2024-06-25 | Stop reason: HOSPADM

## 2024-06-25 RX ORDER — HYDROMORPHONE HYDROCHLORIDE 1 MG/ML
1 INJECTION, SOLUTION INTRAMUSCULAR; INTRAVENOUS; SUBCUTANEOUS EVERY 4 HOURS PRN
Status: DISCONTINUED | OUTPATIENT
Start: 2024-06-25 | End: 2024-06-25 | Stop reason: HOSPADM

## 2024-06-25 RX ORDER — SENNOSIDES 8.6 MG/1
17.2 TABLET ORAL NIGHTLY
Status: DISCONTINUED | OUTPATIENT
Start: 2024-06-25 | End: 2024-06-25 | Stop reason: HOSPADM

## 2024-06-25 RX ORDER — MORPHINE SULFATE 15 MG/1
15 TABLET, FILM COATED, EXTENDED RELEASE ORAL 2 TIMES DAILY
Qty: 14 TABLET | Refills: 0 | Status: SHIPPED | OUTPATIENT
Start: 2024-06-25 | End: 2024-06-25

## 2024-06-25 RX ORDER — OXYCODONE HYDROCHLORIDE 10 MG/1
10 TABLET ORAL EVERY 4 HOURS PRN
Qty: 42 TABLET | Refills: 0 | Status: SHIPPED | OUTPATIENT
Start: 2024-06-25 | End: 2024-07-02 | Stop reason: SDUPTHER

## 2024-06-25 RX ORDER — OXYCODONE HYDROCHLORIDE 10 MG/1
10 TABLET ORAL EVERY 4 HOURS PRN
Qty: 42 TABLET | Refills: 0 | Status: SHIPPED | OUTPATIENT
Start: 2024-06-25 | End: 2024-06-25

## 2024-06-25 RX ORDER — MORPHINE SULFATE 15 MG/1
15 TABLET, FILM COATED, EXTENDED RELEASE ORAL 2 TIMES DAILY
Qty: 14 TABLET | Refills: 0 | Status: SHIPPED | OUTPATIENT
Start: 2024-06-25 | End: 2024-07-02 | Stop reason: SDUPTHER

## 2024-06-25 RX ADMIN — MORPHINE SULFATE 15 MG: 15 TABLET, EXTENDED RELEASE ORAL at 08:06

## 2024-06-25 RX ADMIN — OXYCODONE HYDROCHLORIDE 10 MG: 10 TABLET ORAL at 01:06

## 2024-06-25 RX ADMIN — OXYCODONE HYDROCHLORIDE 10 MG: 10 TABLET ORAL at 05:06

## 2024-06-25 NOTE — CONSULTS
Hematology Oncology Consult Note    Inpatient consult to Hematology/Oncology  Consult performed by: Gideon Siddiqui MD  Consult ordered by: Caleb Dougherty MD        SUBJECTIVE:     History of Present Illness:  Patient is a 58 y.o. male with a PMH of metastatic colorectal carcinoma with metastasis to the liver and lymph  nodes who follows with Dr. Lockett at [facility] who is here abdominal pain and generalized weakness and nausea and vomiting.  CTA chest was negative for PE. CT A/P with Primary colonic mass in the descending colon with local extension of disease along the mesenteric root and surrounding peritoneal soft tissue nodules/tumor deposits.  Extensive hepatic metastasis noting areas of intrahepatic biliary duct dilatation, most prominent in the left hepatic lobe. Additional probable upper abdominal metastatic lymph nodes. Mesenteric edema and trace ascites. Scattered subcentimeter pulmonary nodules, nonspecific.    Medical oncology consulted for metastatic colon cancer.    Today, he remains tachycardic however hemodynamically stable. CBC with leukocytosis of neutrophilic predominance, anemia with Hb of 10.9, CMP with hyponatremia, mild hyperkalemia, hyperbilirubinemia, elevated liver enzymes.  He was evaluated by gastroenterology/AES given elevated T.bili. Felt hyperbilirubinemia is secondary to intrahepatic tumor volume with no areas for interventions. He was seen by his primary outpatient oncologist on 06/21. Per oncologist note, the plan is for palliative chemotherapy with FOLFOX.      Review of patient's allergies indicates:   Allergen Reactions    Asa [aspirin]      No past medical history on file.  No past surgical history on file.  No family history on file.     Review of Systems   HENT: Negative.     Respiratory: Negative.     Cardiovascular: Negative.    Genitourinary: Negative.    Musculoskeletal: Negative.    Neurological: Negative.      OBJECTIVE:     Vital Signs:  Temp:  [98.3 °F (36.8 °C)-98.8 °F  (37.1 °C)]   Pulse:  [101-117]   Resp:  [16-19]   BP: (118-129)/(76-82)   SpO2:  [97 %-98 %]     Physical Exam  Vitals reviewed.   Constitutional:       General: He is not in acute distress.     Appearance: He is not toxic-appearing.   HENT:      Head: Normocephalic and atraumatic.   Eyes:      General: No scleral icterus.     Conjunctiva/sclera: Conjunctivae normal.   Cardiovascular:      Rate and Rhythm: Normal rate.      Pulses: Normal pulses.   Pulmonary:      Effort: Pulmonary effort is normal. No respiratory distress.      Breath sounds: Normal breath sounds. No rales.   Abdominal:      General: Bowel sounds are normal. There is no distension.      Palpations: Abdomen is soft.      Tenderness: There is no abdominal tenderness.   Musculoskeletal:      Right lower leg: No edema.      Left lower leg: No edema.   Skin:     Coloration: Skin is jaundiced. Skin is not pale.   Neurological:      Mental Status: He is alert and oriented to person, place, and time. Mental status is at baseline.   Psychiatric:         Mood and Affect: Mood normal.         Behavior: Behavior normal.       Laboratory:  I have reviewed all pertinent lab results within the past 24 hours.  CBC:   Recent Labs   Lab 06/24/24  0839   WBC 14.55*   RBC 3.96*   HGB 10.9*   HCT 32.9*      MCV 83   MCH 27.5   MCHC 33.1     CMP:   Recent Labs   Lab 06/24/24  0839   *   CALCIUM 9.4   ALBUMIN 2.2*   PROT 8.3   *   K 5.2*   CO2 27   CL 92*   BUN 13   CREATININE 0.7   ALKPHOS 1,076*   *   *   BILITOT 7.1*       Diagnostic Results:  Labs: Reviewed  US: Reviewed    ASSESSMENT/PLAN:     59 yo male with newly diagnosed metastatic colorectal carcinoma to the liver, lymph nodes and potentially lungs. He is [place] [facility] and follows with Dr. Lockett. He was seen on  6/21 and offered palliative FOLFOX however he opted for home hospice without chemotherapy. He is here for chest pain evaluation.    Had a long conversation with  his  nephew, Kye yesterday regarding patient's cancer and prognosis and treatment plan of his treating physician. He was also evaluated by AES, hyperbilirubinemia not amenable to stenting.  Given that hyperbilirubinemia is secondary to significant intrahepatic tumor volume, the hope is that it may improve with the treatment of the cancer. Although, the alternate remains a real possibility despite systemic therapy.  Ultimately, the goal would be to start palliative chemotherapy sooner rather than later. Since he has already established care at [facility], hopefully he may be able to start treatment  more urgently than establishing care elsewhere. I agree with systemic therapy with FOLFOX in setting of elevated bilirubin.  In the event, that he would want to transfer his care to our office or seek a second opinion, we would be more than happy to see him.    Discussed case at length with his treatment physician who is willing to continue providing cancer care to him. Earliest available date for chemo chair is 7/3.    Recommendations:   - Close follow up with primary oncologist to initiate systemic treatment  - Palliative care on board. Appreciate recommendation  - No inpatient medical oncological evaluation anticipated      Discussed with Dr. Lindsey      We will sign off at this time. Let us know if any questions.      Gideon Siddiqui MD  Hematology/Oncology PGY-IV

## 2024-06-25 NOTE — DISCHARGE SUMMARY
[facility] - Med Surg ([address] Medicine  Discharge Summary      Patient Name: Diego Mckeon  MRN: [id number]  Admission Date: 6/24/2024  Hospital Length of Stay: 1 days  Discharge Date and Time:  06/25/2024 3:36 PM  Attending Physician: Caleb Dougherty MD   Discharging Provider: Caleb Dougherty MD  Discharge Provider Team: [facility]  Primary Care Provider: Josi Primary Doctor        HPI: 58-year-old gentleman past medical history of recently diagnosed metastatic colon cancer who had care at [facility] and was discharged home on home hospice presented to [facility] for shortness of breath, abdominal pain and a 2nd opinion. Per chart review, patient was seen by his hematologist oncologist at [facility] on 06/20 were discussions was made about risk of doing chemotherapy. Spoke with patient, wife and nephew. They were dissatisfied with the lack of/poor communication at [facility] and wanted a 2nd opinion at [facility] though they understand that patient has a poor prognosis. Patient at this time has no complaints. CT abdomen pelvis done showed evidence of metastatic colon cancer with now pulmonary nodules. Potassium of 5.2     * No surgery found *      Hospital Course:  Patient was admitted for pain control and a request for 2nd opinion by Oncology. AES was also consulted to determine possibility of stent placement given his transaminitis and elevated bilirubins, they opted against doing any procedures.  Palliative Care was also consulted.  Oncology reviewed patient and recommended that patient goes back to [facility]  appointment for palliative chemo next week Wednesday, they will also schedule a new patient follow up next week Tuesday.  He presented with potassium of 5.2 secondary to his home losartan, losartan was held and blood pressure was fine, orders were placed for repeat labs however patient refused, he will repeat labs as an outpatient and follow up with primary care.  Referral given to palliative care    Consults:   Consults (From  admission, onward)          Status Ordering Provider     Inpatient consult to Hematology/Oncology  Once        Provider:  (Not yet assigned)    Completed FLORI DUBON     Inpatient consult to Palliative Care  Once        Provider:  (Not yet assigned)    Acknowledged FLORI DUBON     Inpatient consult to [facility] ([facility]  Once        Provider:  (Not yet assigned)    Completed FLORI DUBON            Final Active Diagnoses:    Diagnosis Date Noted POA    PRINCIPAL PROBLEM:  Metastatic colon cancer to liver [C18.9, C78.7] 06/24/2024 Yes    Hyperkalemia [E87.5] 06/24/2024 Yes      Problems Resolved During this Admission:      Discharged Condition:  guarded    Disposition: Home or Self Care    Follow Up:   Follow-up Information       No, Primary Doctor Follow up in 1 week(s).                           Patient Instructions:      BASIC METABOLIC PANEL   Standing Status: Future Standing Exp. Date: 09/23/25     Medications:  Reconciled Home Medications:      Medication List        CHANGE how you take these medications      oxyCODONE 10 mg Tab immediate release tablet  Commonly known as: ROXICODONE  Take 1 tablet (10 mg total) by mouth every 4 (four) hours as needed for Pain.  What changed: when to take this            CONTINUE taking these medications      cyclobenzaprine 7.5 MG Tab  Commonly known as: FEXMID  TAKE 1 Tablet BY MOUTH THREE TIMES DAILY AS NEEDED FOR lower BACK pain     dicyclomine 20 mg tablet  Commonly known as: BENTYL  TAKE 1 Tablet BY MOUTH FOUR TIMES DAILY FOR lower abdominal pain     ergocalciferol 50,000 unit Cap  Commonly known as: ERGOCALCIFEROL  Take 1 capsule by mouth every 7 days. On Saturday.     LIDOcaine 5 %  Commonly known as: LIDODERM  Place 1 patch onto the skin once daily. To lower back.     metFORMIN 500 MG tablet  Commonly known as: GLUCOPHAGE  Take 500 mg by mouth 2 (two) times daily with meals.     morphine 15 MG 12 hr tablet  Commonly known as: MS CONTIN  Take 1 tablet (15 mg  total) by mouth 2 (two) times daily. for 7 days     omeprazole 20 MG capsule  Commonly known as: PRILOSEC  TAKE 1 Capsule BY MOUTH EVERY DAY 30 minutes TO 1 hour BEFORE A MEAL            STOP taking these medications      atorvastatin 10 MG tablet  Commonly known as: LIPITOR     hydrocortisone-pramoxine rectal foam  Commonly known as: PROCTOFOAM-HS     losartan 25 MG tablet  Commonly known as: COZAAR                  Pending Diagnostic Studies:       Procedure Component Value Units Date/Time    Basic metabolic panel [[id number]]     Order Status: Sent Lab Status: No result     Specimen: Blood           Indwelling Lines/Drains at time of discharge:   Lines/Drains/Airways       None                   Time spent on the discharge of patient: 35 minutes         Caleb Dougherty MD  Department of Hospital Medicine  [facility] ([place])

## 2024-06-25 NOTE — SUBJECTIVE & OBJECTIVE
No past medical history on file.    No past surgical history on file.    Review of patient's allergies indicates:   Allergen Reactions    Asa [aspirin]        Medications:  Continuous Infusions:  Scheduled Meds:   morphine  15 mg Oral BID    senna  17.2 mg Oral QHS     PRN Meds:  Current Facility-Administered Medications:     dextrose 10%, 12.5 g, Intravenous, PRN    dextrose 10%, 25 g, Intravenous, PRN    dextrose, 16 g, Oral, PRN    dextrose, 24 g, Oral, PRN    glucagon (human recombinant), 1 mg, Intramuscular, PRN    HYDROmorphone, 1 mg, Intravenous, Q4H PRN    naloxone, 0.02 mg, Intravenous, PRN    ondansetron, 4 mg, Intravenous, Q8H PRN    oxyCODONE, 10 mg, Oral, Q3H PRN    sodium chloride 0.9%, 10 mL, Intravenous, Q12H PRN    Family History    None       Tobacco Use    Smoking status: Not on file    Smokeless tobacco: Not on file   Substance and Sexual Activity    Alcohol use: Not on file    Drug use: Not on file    Sexual activity: Not on file       Review of Systems   Gastrointestinal:  Positive for abdominal pain.     Objective:     Vital Signs (Most Recent):  Temp: 98.2 °F (36.8 °C) (06/25/24 1115)  Pulse: 110 (06/25/24 1132)  Resp: 18 (06/25/24 1322)  BP: (!) 127/90 (06/25/24 1115)  SpO2: 97 % (06/25/24 1544) Vital Signs (24h Range):  Temp:  [98.2 °F (36.8 °C)-98.8 °F (37.1 °C)] 98.2 °F (36.8 °C)  Pulse:  [101-117] 110  Resp:  [16-19] 18  SpO2:  [96 %-98 %] 97 %  BP: (112-129)/(76-90) 127/90        There is no height or weight on file to calculate BMI.       Physical Exam  Vitals and nursing note reviewed.   Constitutional:       General: He is not in acute distress.  Cardiovascular:      Rate and Rhythm: Tachycardia present.   Pulmonary:      Effort: Pulmonary effort is normal. No respiratory distress.   Abdominal:      General: There is distension.      Tenderness: There is no abdominal tenderness.   Neurological:      Mental Status: He is alert and oriented to person, place, and time.         "    Review of Symptoms      Symptom Assessment (ESAS 0-10 Scale)  Pain:  6  Dyspnea:  0  Anxiety:  0  Nausea:  0  Depression:  0  Anorexia:  0  Fatigue:  0  Insomnia:  0  Restlessness:  0  Agitation:  0     CAM / Delirium:  Negative  Constipation:  Negative  Diarrhea:  Negative      Bowel Management Plan (BMP):  No      Pain Assessment:    Location(s): abdomen    Abdomen       Location: generalized        Quality: Sharp        Quantity: 6/10 in intensity        Chronicity: Onset 1 month(s) ago, gradually worsening        Aggravating Factors: None        Alleviating Factors: Opiates        Associated Symptoms: None    Modified Chad Scale:  0    ECOG Performance Status ndGndrndanddndend:nd nd2nd Living Arrangements:  Lives with spouse    Psychosocial/Cultural:   See Palliative Psychosocial Note: No  . Large supportive family   **Primary  to Follow**  Palliative Care  Consult: No    Spiritual:  F - Karen and Belief:  Yes  A - Address in Care:  Yes        Advance Care Planning   Advance Directives:   Living Will: No    LaPOST: No    Do Not Resuscitate Status: No    Medical Power of : No      Decision Making:  Patient answered questions and Family answered questions  Goals of Care: The patient endorses that what is most important right now is to focus on life-prolongation     Accordingly, we have decided that the best plan to meet the patient's goals includes continuing with treatment         Significant Labs: All pertinent labs within the past 24 hours have been reviewed.  CBC:   Recent Labs   Lab 06/24/24  0839   WBC 14.55*   HGB 10.9*   HCT 32.9*   MCV 83        BMP:  No results for input(s): "GLU", "NA", "K", "CL", "CO2", "BUN", "CREATININE", "CALCIUM", "MG" in the last 24 hours.  LFT:  Lab Results   Component Value Date     (H) 06/24/2024    ALKPHOS 1,076 (H) 06/24/2024    BILITOT 7.1 (H) 06/24/2024     Albumin:   Albumin   Date Value Ref Range Status   06/24/2024 2.2 (L) 3.5 " "- 5.2 g/dL Final     Protein:   Total Protein   Date Value Ref Range Status   06/24/2024 8.3 6.0 - 8.4 g/dL Final     Lactic acid:   No results found for: "LACTATE"    Significant Imaging: I have reviewed all pertinent imaging results/findings within the past 24 hours.    CT A/P 6/24/24  No convincing evidence of pulmonary thromboembolism.     Primary colonic mass in the descending colon with local extension of disease along the mesenteric root and surrounding peritoneal soft tissue nodules/tumor deposits.     Extensive hepatic metastasis noting areas of intrahepatic biliary duct dilatation, most prominent in the left hepatic lobe.  Additional probable upper abdominal metastatic lymph nodes.     Mesenteric edema and trace ascites.     Scattered subcentimeter pulmonary nodules, nonspecific.      "

## 2024-06-25 NOTE — PROGRESS NOTES
[facility] ([address] Medicine  Progress Note    Patient Name: Diego Mckeon  MRN: [id number]  Patient Class: IP- Inpatient   Admission Date: 6/24/2024  Length of Stay: 1 days  Attending Physician: Caleb Dougherty MD  Primary Care Provider: Josi, Primary Doctor        Subjective:     Principal Problem:<principal problem not specified>        HPI:  58-year-old gentleman past medical history of recently diagnosed metastatic colon cancer who had care at [facility] and was discharged home on home hospice presented to [facility] for shortness of breath, abdominal pain and a 2nd opinion.  Per chart review, patient was seen by his hematologist oncologist at [facility] on 06/20 were discussions was made about risk of doing chemotherapy.  Spoke with patient, wife and nephew.  They were dissatisfied with the lack of/poor communication at [facility] and wanted a 2nd opinion at [facility] though they understand that patient has a poor prognosis.  Patient at this time has no complaints.  CT abdomen pelvis done showed evidence of metastatic colon cancer with now pulmonary nodules.  Potassium of 5.2    Overview/Hospital Course:  No notes on file    Interval History:  Discussed with Oncology, GI and palliative care.  Decision at this time is no intervention by GI.  Focus on pain management and considerations for palliative chemo for cancer.  Aside from pain patient was no complaints at this time    Review of Systems   Respiratory:  Positive for shortness of breath.    Gastrointestinal:  Positive for abdominal pain.     Objective:     Vital Signs (Most Recent):  Temp: 98.2 °F (36.8 °C) (06/25/24 1115)  Pulse: 110 (06/25/24 1132)  Resp: 19 (06/25/24 1115)  BP: (!) 127/90 (06/25/24 1115)  SpO2: 96 % (06/25/24 1115) Vital Signs (24h Range):  Temp:  [98.2 °F (36.8 °C)-98.9 °F (37.2 °C)] 98.2 °F (36.8 °C)  Pulse:  [101-117] 110  Resp:  [16-20] 19  SpO2:  [96 %-100 %] 96 %  BP: (112-131)/(76-92) 127/90        There is no height or weight on file  to calculate BMI.  No intake or output data in the 24 hours ending 06/25/24 1151      Physical Exam  Constitutional:       Appearance: He is ill-appearing.   HENT:      Head: Normocephalic.      Right Ear: External ear normal.      Left Ear: External ear normal.      Nose: Nose normal.   Eyes:      General: Scleral icterus present.   Cardiovascular:      Rate and Rhythm: Normal rate.   Pulmonary:      Effort: Pulmonary effort is normal.   Abdominal:      Palpations: Abdomen is soft.      Tenderness: There is no abdominal tenderness.   Musculoskeletal:      Right lower leg: Edema present.      Left lower leg: Edema present.   Skin:     General: Skin is warm.   Neurological:      General: No focal deficit present.      Mental Status: He is alert and oriented to person, place, and time.        Assessment/Plan:      Hyperkalemia  Potassium of 5.2, we will hold home losartan as blood pressures have been fine.  6/25  F/u rpt bmp            Metastatic colon cancer to liver  As mentioned above, laure came to [facility] for 2nd opinion though patient and relatives understands poor prognosis.  We will continue home pain meds, p.r.n. Dilaudid.  Spoke with patient and family, though probably not much to add, we will reach out to oncology  6/25  Discussed with Oncology, GI and palliative care. Decision at this time is no intervention by GI. Focus on pain management and considerations for palliative chemo for cancer.         VTE Risk Mitigation (From admission, onward)           Ordered     IP VTE LOW RISK PATIENT  Once         06/24/24 1221     Place sequential compression device  Until discontinued         06/24/24 1221                    Discharge Planning   MATTY: 6/27/2024     Code Status: Full Code   Is the patient medically ready for discharge?:     Reason for patient still in hospital (select all that apply): Patient trending condition                     Caleb Dougherty MD  Department of Hospital Medicine   [facility]  ([facility])

## 2024-06-25 NOTE — PLAN OF CARE
[facility] ([facility]  Initial Discharge Assessment       Primary Care Provider: Josi, Primary Doctor    Admission Diagnosis: Shortness of breath [R06.02]  Chest pain [R07.9]  Metastatic malignant neoplasm, unspecified site [C79.9]    Admission Date: 6/24/2024  Expected Discharge Date: 6/25/2024    Transition of Care Barriers: (P) Underinsured    Payor: MEDICAID / Plan: [place] [employer] / Product Type: Managed Medicaid /     Extended Emergency Contact Information  Primary Emergency Contact: Kye Perea  Mobile Phone: [phone]  Relation: Relative  Preferred language: English   needed? No  Secondary Emergency Contact: Tutu Mckeon  Mobile Phone: [phone]  Relation: Son  Preferred language: Kyrgyz   needed? No    Discharge Plan A: (P) Home with family  Discharge Plan B: (P) Home with family    No Pharmacies Listed    Initial Assessment (most recent)       Adult Discharge Assessment - 06/25/24 1558          Discharge Assessment    Assessment Type Discharge Planning Assessment (P)      Confirmed/corrected address, phone number and insurance Yes (P)      Confirmed Demographics Correct on Facesheet (P)      Source of Information family (P)      Communicated MATTY with patient/caregiver Yes (P)      Reason For Admission Metastatic colon CA (P)      People in Home spouse;child(edison), dependent (P)      Facility Arrived From: n/a (P)      Do you expect to return to your current living situation? Yes (P)      Do you have help at home or someone to help you manage your care at home? Yes (P)      Who are your caregiver(s) and their phone number(s)? Wife Alisa Mckeon (P)      Prior to hospitilization cognitive status: Unable to Assess (P)      Current cognitive status: Unable to Assess (P)      Walking or Climbing Stairs Difficulty yes (P)      Mobility Management SHUN Kye states that pt has trouble navigating stairs when he is in pain - he is able to use downstairs. (P)       Dressing/Bathing Difficulty yes (P)      Dressing/Bathing bathing difficulty, assistance 1 person;dressing difficulty, assistance 1 person (P)      Dressing/Bathing Management Wife helps (P)      Home Layout Bedroom on 2nd floor;Bathroom on 2nd floor (P)      Equipment Currently Used at Home none (P)      Readmission within 30 days? No (P)      Do you currently have service(s) that help you manage your care at home? No (P)      Do you take prescription medications? Yes (P)      Do you have prescription coverage? Yes (P)      Coverage [employer] (P)      Do you have any problems affording any of your prescribed medications? No (P)      Who is going to help you get home at discharge? Nephcarl Fishman or pt's sons (P)      How do you get to doctors appointments? family or friend will provide (P)      Are you on dialysis? No (P)      Do you take coumadin? No (P)      Discharge Plan A Home with family (P)      Discharge Plan B Home with family (P)      DME Needed Upon Discharge  none (P)    Kye/pt's wife feel like pt might need walker at some point; CM instructed to f/u with PCP for DME needed after hospital d/c.    Discharge Plan discussed with: Spouse/sig other;Caregiver (P)      Name(s) and Number(s) Kye ramachandran [phone] (P)      Transition of Care Barriers Underinsured (P)         Physical Activity    On average, how many days per week do you engage in moderate to strenuous exercise (like a brisk walk)? 0 days (P)      On average, how many minutes do you engage in exercise at this level? 0 min (P)         Financial Resource Strain    How hard is it for you to pay for the very basics like food, housing, medical care, and heating? Not hard at all (P)         Stress    Do you feel stress - tense, restless, nervous, or anxious, or unable to sleep at night because your mind is troubled all the time - these days? Rather much (P)         Social Isolation    How often do you feel lonely or isolated from those around you?   Never (P)         Alcohol Use    Q1: How often do you have a drink containing alcohol? Never (P)      Q2: How many drinks containing alcohol do you have on a typical day when you are drinking? Patient does not drink (P)      Q3: How often do you have six or more drinks on one occasion? Never (P)         Health Literacy    How often do you need to have someone help you when you read instructions, pamphlets, or other written material from your doctor or pharmacy? Sometimes (P)    If information is in Faroese, pt understands on his own; if information is in English, pt needs help.       OTHER    Name(s) of People in Home Alisa Mckeon (P)                  CM spoke with pt's nephew, wife and sons in room.  Nephew Kye was main person CM spoke with.  Pt was not in room.  Pt lives in 2 story home with wife and sons, came from home.  Pt's nephew Kye or pt's sons will give him a ride home; family provides rides to MD appts.  No HH, DME, coumadin or HD.  Kye states that pt had one visit from , that was a mistake.  Pt's family does not want hospice at this time.  Kye states that pt may need walker in the future.  CM instructed to have pt f/u with PCP for this.  Kye states that they prefer to make their own PCP f/u visit after pt d/c'd.  Pt's wife helps pt with bathing and dressing.    CG instructed in d/c process; CG v/u.    ANGELA Hewitt, BS, RN, CCM      Discharge Plan A and Plan B have been determined by review of patient's clinical status, future medical and therapeutic needs, and coverage/benefits for post-acute care in coordination with multidisciplinary team members.

## 2024-06-25 NOTE — ASSESSMENT & PLAN NOTE
58 year old male with new dx colon cancer with mets to liver presents to [facility] from [facility] with second opinion. Per family patient was discharged from [facility] with hospice and they had not agreed to that. Patient wishes to pursue cancer directed therapies. Palliative consulted for pain management       Code status: Full, not addressed     Surrogate decision maker: Legal NOK is wife     GOC/ACP  -Patient would like to purse any palliative cancer directed therapy that is offered. They are not wanting hospice unless they are told he has so cancer directed treatment options. Plans to follow-up with onc outpatient.     Cancer associated pain   -Recommend continuing Mscontin 15mg BID  -Start oxycodone 10mg q3 prn. Will adjust Mscontin based on prn use   -Provided education regarding long acting and shorting acting opioids   -IV dilaudid prn for severe breakthrough unrelieved by oxy after 45 minutes. Reduce dose to 1mg     Opioid induced constipation   -Start senna 17.2mg qhs     Patient can follow[place] in [place] medicine [place] [facility]. However if patient is going to pursue cancer directed treatment at [facility] it would make more sense to follow with [facility] palliative    Discussed with Dr. Dougherty

## 2024-06-25 NOTE — CONSULTS
[facility] ([place] [id number])  Palliative Medicine  Consult Note    Patient Name: Diego Mckeon  MRN: [id number]  Admission Date: 6/24/2024  Hospital Length of Stay: 1 days  Code Status: Full Code   Attending Provider: Caleb Dougherty MD  Consulting Provider: Gilda Olivas MD  Primary Care Physician: Josi, Primary Doctor  Principal Problem:Metastatic colon cancer to liver    Patient information was obtained from patient, relative(s), and primary team.      Inpatient consult to Palliative Care  Consult performed by: Gilda Olivas MD  Consult ordered by: Caleb Dougherty MD        Assessment/Plan:     Palliative Care  Palliative care encounter  58 year old male with new dx colon cancer with mets to liver presents to [facility] from [facility] with [place] opinion. Per family patient was discharged from [facility] with hospice and they had not agreed to that. Patient wishes to pursue cancer directed therapies. Palliative consulted for pain management       Code status: Full, not addressed     Surrogate decision maker: Legal NOK is wife     GOC/ACP  -Patient would like to purse any palliative cancer directed therapy that is offered. They are not wanting hospice unless they are told he has so cancer directed treatment options. Plans to follow-up with onc outpatient.     Cancer associated pain   -Recommend continuing Mscontin 15mg BID  -Start oxycodone 10mg q3 prn. Will adjust Mscontin based on prn use   -Provided education regarding long acting and shorting acting opioids   -IV dilaudid prn for severe breakthrough unrelieved by oxy after 45 minutes. Reduce dose to 1mg     Opioid induced constipation   -Start senna 17.2mg qhs     Patient can follow-up in palliative medicine clinic at [facility]. However if patient is going to pursue cancer directed treatment at [facility] it would make more sense to follow with [facility] palliative    Discussed with Dr. Dougherty        Thank you for your consult. I will follow-up with patient. Please contact us if you have any  "additional questions.    Subjective:     HPI:   Per hospital medicine H&P  "58-year-old gentleman past medical history of recently diagnosed metastatic colon cancer who had care at [facility] and was discharged home on home hospice presented to [facility] for shortness of breath, abdominal pain and a 2nd opinion. Per chart review, patient was seen by his hematologist oncologist at [facility] on 06/20 were discussions was made about risk of doing chemotherapy. Spoke with patient, wife and nephew. They were dissatisfied with the lack of/poor communication at [facility] and wanted a 2nd opinion at [facility] though they understand that patient has a poor prognosis. Patient at this time has no complaints. CT abdomen pelvis done showed evidence of metastatic colon cancer with now pulmonary nodules. Potassium of 5.2 "      Asked to use to ipad . Family refused. Wanted nephew to translate. Palliative consulted for pain recs     Hospital Course:  No notes on file        No past medical history on file.    No past surgical history on file.    Review of patient's allergies indicates:   Allergen Reactions    Asa [aspirin]        Medications:  Continuous Infusions:  Scheduled Meds:   morphine  15 mg Oral BID    senna  17.2 mg Oral QHS     PRN Meds:  Current Facility-Administered Medications:     dextrose 10%, 12.5 g, Intravenous, PRN    dextrose 10%, 25 g, Intravenous, PRN    dextrose, 16 g, Oral, PRN    dextrose, 24 g, Oral, PRN    glucagon (human recombinant), 1 mg, Intramuscular, PRN    HYDROmorphone, 1 mg, Intravenous, Q4H PRN    naloxone, 0.02 mg, Intravenous, PRN    ondansetron, 4 mg, Intravenous, Q8H PRN    oxyCODONE, 10 mg, Oral, Q3H PRN    sodium chloride 0.9%, 10 mL, Intravenous, Q12H PRN    Family History    None       Tobacco Use    Smoking status: Not on file    Smokeless tobacco: Not on file   Substance and Sexual Activity    Alcohol use: Not on file    Drug use: Not on file    Sexual activity: Not on file       Review of " Systems   Gastrointestinal:  Positive for abdominal pain.     Objective:     Vital Signs (Most Recent):  Temp: 98.2 °F (36.8 °C) (06/25/24 1115)  Pulse: 110 (06/25/24 1132)  Resp: 18 (06/25/24 1322)  BP: (!) 127/90 (06/25/24 1115)  SpO2: 97 % (06/25/24 1544) Vital Signs (24h Range):  Temp:  [98.2 °F (36.8 °C)-98.8 °F (37.1 °C)] 98.2 °F (36.8 °C)  Pulse:  [101-117] 110  Resp:  [16-19] 18  SpO2:  [96 %-98 %] 97 %  BP: (112-129)/(76-90) 127/90        There is no height or weight on file to calculate BMI.       Physical Exam  Vitals and nursing note reviewed.   Constitutional:       General: He is not in acute distress.  Cardiovascular:      Rate and Rhythm: Tachycardia present.   Pulmonary:      Effort: Pulmonary effort is normal. No respiratory distress.   Abdominal:      General: There is distension.      Tenderness: There is no abdominal tenderness.   Neurological:      Mental Status: He is alert and oriented to person, place, and time.            Review of Symptoms      Symptom Assessment (ESAS 0-10 Scale)  Pain:  6  Dyspnea:  0  Anxiety:  0  Nausea:  0  Depression:  0  Anorexia:  0  Fatigue:  0  Insomnia:  0  Restlessness:  0  Agitation:  0     CAM / Delirium:  Negative  Constipation:  Negative  Diarrhea:  Negative      Bowel Management Plan (BMP):  No      Pain Assessment:    Location(s): abdomen    Abdomen       Location: generalized        Quality: Sharp        Quantity: 6/10 in intensity        Chronicity: Onset 1 month(s) ago, gradually worsening        Aggravating Factors: None        Alleviating Factors: Opiates        Associated Symptoms: None    Modified Chad Scale:  0    ECOG Performance Status ndGndrndanddndend:nd nd2nd Living Arrangements:  Lives with spouse    Psychosocial/Cultural:   See Palliative Psychosocial Note: No  . Large supportive family   **Primary  to Follow**  Palliative Care  Consult: No    Spiritual:  F - Karen and Belief:  Yes  A - Address in Care:   "Yes        Advance Care Planning  Advance Directives:   Living Will: No    LaPOST: No    Do Not Resuscitate Status: No    Medical Power of : No      Decision Making:  Patient answered questions and Family answered questions  Goals of Care: The patient endorses that what is most important right now is to focus on life-prolongation     Accordingly, we have decided that the best plan to meet the patient's goals includes continuing with treatment         Significant Labs: All pertinent labs within the past 24 hours have been reviewed.  CBC:   Recent Labs   Lab 06/24/24  0839   WBC 14.55*   HGB 10.9*   HCT 32.9*   MCV 83        BMP:  No results for input(s): "GLU", "NA", "K", "CL", "CO2", "BUN", "CREATININE", "CALCIUM", "MG" in the last 24 hours.  LFT:  Lab Results   Component Value Date     (H) 06/24/2024    ALKPHOS 1,076 (H) 06/24/2024    BILITOT 7.1 (H) 06/24/2024     Albumin:   Albumin   Date Value Ref Range Status   06/24/2024 2.2 (L) 3.5 - 5.2 g/dL Final     Protein:   Total Protein   Date Value Ref Range Status   06/24/2024 8.3 6.0 - 8.4 g/dL Final     Lactic acid:   No results found for: "LACTATE"    Significant Imaging: I have reviewed all pertinent imaging results/findings within the past 24 hours.    CT A/P 6/24/24  No convincing evidence of pulmonary thromboembolism.     Primary colonic mass in the descending colon with local extension of disease along the mesenteric root and surrounding peritoneal soft tissue nodules/tumor deposits.     Extensive hepatic metastasis noting areas of intrahepatic biliary duct dilatation, most prominent in the left hepatic lobe.  Additional probable upper abdominal metastatic lymph nodes.     Mesenteric edema and trace ascites.     Scattered subcentimeter pulmonary nodules, nonspecific.        I spent a total of 75 minutes on the day of the visit. This includes face to face time in discussion of goals of care, symptom assessment, coordination of care and " emotional support.  This also includes non-face to face time preparing to see the patient (eg, review of tests/imaging), obtaining and/or reviewing separately obtained history, documenting clinical information in the electronic or other health record, independently interpreting results and communicating results to the patient/family/caregiver, or care coordinator.    Gilda Olivas MD  Palliative Medicine  [facility] S[facility] ([facility])

## 2024-06-25 NOTE — PLAN OF CARE
CM went to pt room to do initial assessment.  Per Nicolette, pt just got some bad news and is not ready to speak yet.  CM to follow.    RACHEL HewittN, BS, RN, [facility]

## 2024-06-25 NOTE — SUBJECTIVE & OBJECTIVE
Interval History:  Discussed with Oncology, GI and palliative care.  Decision at this time is no intervention by GI.  Focus on pain management and considerations for palliative chemo for cancer.  Aside from pain patient was no complaints at this time    Review of Systems   Respiratory:  Positive for shortness of breath.    Gastrointestinal:  Positive for abdominal pain.     Objective:     Vital Signs (Most Recent):  Temp: 98.2 °F (36.8 °C) (06/25/24 1115)  Pulse: 110 (06/25/24 1132)  Resp: 19 (06/25/24 1115)  BP: (!) 127/90 (06/25/24 1115)  SpO2: 96 % (06/25/24 1115) Vital Signs (24h Range):  Temp:  [98.2 °F (36.8 °C)-98.9 °F (37.2 °C)] 98.2 °F (36.8 °C)  Pulse:  [101-117] 110  Resp:  [16-20] 19  SpO2:  [96 %-100 %] 96 %  BP: (112-131)/(76-92) 127/90        There is no height or weight on file to calculate BMI.  No intake or output data in the 24 hours ending 06/25/24 1151      Physical Exam  Constitutional:       Appearance: He is ill-appearing.   HENT:      Head: Normocephalic.      Right Ear: External ear normal.      Left Ear: External ear normal.      Nose: Nose normal.   Eyes:      General: Scleral icterus present.   Cardiovascular:      Rate and Rhythm: Normal rate.   Pulmonary:      Effort: Pulmonary effort is normal.   Abdominal:      Palpations: Abdomen is soft.      Tenderness: There is no abdominal tenderness.   Musculoskeletal:      Right lower leg: Edema present.      Left lower leg: Edema present.   Skin:     General: Skin is warm.   Neurological:      General: No focal deficit present.      Mental Status: He is alert and oriented to person, place, and time.

## 2024-06-25 NOTE — ASSESSMENT & PLAN NOTE
Potassium of 5.2, we will hold home losartan as blood pressures have been fine.  6/25  F/u rpt bmp

## 2024-06-25 NOTE — HPI
"Per hospital medicine H&P  "58-year-old gentleman past medical history of recently diagnosed metastatic colon cancer who had care at [facility] and was discharged home on home hospice presented to [facility] for shortness of breath, abdominal pain and a 2nd opinion. Per chart review, patient was seen by his hematologist oncologist at [facility] on 06/20 were discussions was made about risk of doing chemotherapy. Spoke with patient, wife and nephew. They were dissatisfied with the lack of/poor communication at [facility] and wanted a 2nd opinion at [facility] though they understand that patient has a poor prognosis. Patient at this time has no complaints. CT abdomen pelvis done showed evidence of metastatic colon cancer with now pulmonary nodules. Potassium of 5.2 "      Asked to use to ipad . Family refused. Wanted nephew to translate. Palliative consulted for pain recs   "

## 2024-06-25 NOTE — ED NOTES
Telemetry Verification   Patient placed on Telemetry Box  Verified with War Room  Box # [id number]   Monitor Tech    Rate 117   Rhythm tachy

## 2024-06-25 NOTE — ASSESSMENT & PLAN NOTE
As mentioned above, negritadorene came to [facility] for 2nd opinion though patient and relatives understands poor prognosis.  We will continue home pain meds, p.r.n. Dilaudid.  Spoke with patient and family, though probably not much to add, we will reach out to oncology  6/25  Discussed with Oncology, GI and palliative care. Decision at this time is no intervention by GI. Focus on pain management and considerations for palliative chemo for cancer.      Tc Suicide Risk Assessment deferred, as unable to assess while patient sleeping  Behavioral Health Assessment deferred as patient is sleeping and would benefit from additional rest   Vital signs deferred until patient awake, no signs or symptoms of respiratory distress at this time  Once patient is awake and able to participate, will complete assessments         Trinity Hays RN  04/19/21 [id number]

## 2024-06-26 NOTE — PLAN OF CARE
[facility] ([facility])  Discharge Final Note    Primary Care Provider: No, Primary Doctor    Expected Discharge Date: 6/25/2024    Final Discharge Note (most recent)       Final Note - 06/26/24 0809          Final Note    Assessment Type Final Discharge Note (P)      Anticipated Discharge Disposition Home or Self Care (P)         Post-Acute Status    Discharge Delays None known at this time (P)                      Important Message from Medicare             Contact Info       No, Primary Doctor   Relationship: PCP - General        Next Steps: Follow up in 1 week(s)        Pt d/c'd to home with ride from family.    Monalisa Kimball, BSN, BS, RN, CCM

## 2024-07-02 ENCOUNTER — NURSE TRIAGE (OUTPATIENT)
Dept: ADMINISTRATIVE | Facility: CLINIC | Age: 59
End: 2024-07-02
Payer: MEDICAID

## 2024-07-02 ENCOUNTER — OFFICE VISIT (OUTPATIENT)
Dept: HEMATOLOGY/ONCOLOGY | Facility: CLINIC | Age: 59
End: 2024-07-02
Payer: MEDICAID

## 2024-07-02 VITALS
OXYGEN SATURATION: 95 % | DIASTOLIC BLOOD PRESSURE: 80 MMHG | HEART RATE: 120 BPM | WEIGHT: 171.44 LBS | SYSTOLIC BLOOD PRESSURE: 120 MMHG | TEMPERATURE: 99 F

## 2024-07-02 DIAGNOSIS — E80.6 HYPERBILIRUBINEMIA: ICD-10-CM

## 2024-07-02 DIAGNOSIS — Z71.89 GOALS OF CARE, COUNSELING/DISCUSSION: ICD-10-CM

## 2024-07-02 DIAGNOSIS — R62.7 FAILURE TO THRIVE IN ADULT: ICD-10-CM

## 2024-07-02 DIAGNOSIS — C78.7 METASTATIC COLON CANCER TO LIVER: Primary | ICD-10-CM

## 2024-07-02 DIAGNOSIS — G89.3 NEOPLASM RELATED PAIN: ICD-10-CM

## 2024-07-02 DIAGNOSIS — C18.9 METASTATIC COLON CANCER TO LIVER: Primary | ICD-10-CM

## 2024-07-02 PROCEDURE — 99213 OFFICE O/P EST LOW 20 MIN: CPT | Mod: PBBFAC | Performed by: INTERNAL MEDICINE

## 2024-07-02 PROCEDURE — 99999 PR PBB SHADOW E&M-EST. PATIENT-LVL III: CPT | Mod: PBBFAC,,, | Performed by: INTERNAL MEDICINE

## 2024-07-02 PROCEDURE — 99205 OFFICE O/P NEW HI 60 MIN: CPT | Mod: S$PBB,,, | Performed by: INTERNAL MEDICINE

## 2024-07-02 PROCEDURE — 1111F DSCHRG MED/CURRENT MED MERGE: CPT | Mod: CPTII,,, | Performed by: INTERNAL MEDICINE

## 2024-07-02 PROCEDURE — 4010F ACE/ARB THERAPY RXD/TAKEN: CPT | Mod: CPTII,,, | Performed by: INTERNAL MEDICINE

## 2024-07-02 PROCEDURE — 3074F SYST BP LT 130 MM HG: CPT | Mod: CPTII,,, | Performed by: INTERNAL MEDICINE

## 2024-07-02 PROCEDURE — 3079F DIAST BP 80-89 MM HG: CPT | Mod: CPTII,,, | Performed by: INTERNAL MEDICINE

## 2024-07-02 PROCEDURE — 1159F MED LIST DOCD IN RCRD: CPT | Mod: CPTII,,, | Performed by: INTERNAL MEDICINE

## 2024-07-02 RX ORDER — LOSARTAN POTASSIUM 25 MG/1
25 TABLET ORAL DAILY
COMMUNITY

## 2024-07-02 RX ORDER — MORPHINE SULFATE 15 MG/1
15 TABLET, FILM COATED, EXTENDED RELEASE ORAL 2 TIMES DAILY
Qty: 60 TABLET | Refills: 0 | Status: SHIPPED | OUTPATIENT
Start: 2024-07-02 | End: 2024-08-01

## 2024-07-02 RX ORDER — OXYCODONE HYDROCHLORIDE 10 MG/1
10 TABLET ORAL EVERY 4 HOURS PRN
Qty: 150 TABLET | Refills: 0 | Status: SHIPPED | OUTPATIENT
Start: 2024-07-02 | End: 2024-08-01

## 2024-07-02 RX ORDER — ATORVASTATIN CALCIUM 10 MG/1
10 TABLET, FILM COATED ORAL DAILY
COMMUNITY

## 2024-07-02 RX ORDER — ONDANSETRON HYDROCHLORIDE 8 MG/1
8 TABLET, FILM COATED ORAL EVERY 8 HOURS PRN
Qty: 60 TABLET | Refills: 2 | Status: SHIPPED | OUTPATIENT
Start: 2024-07-02

## 2024-07-02 NOTE — PROGRESS NOTES
"  NEW ONCOLOGY VISIT - ESTABLISH CARE    Subjective:      Patient ID: Diego Mckeon    Chief Complaint: Metastatic Colon Adenocarcinoma    HPI  Diego Mckeon is a 58 y.o. male, referred by Yris Lindsey MD, to clinic for evaluation and management of newly diagnosed metastatic colon adenocarcinoma. He has a PMH HTN, HLD, DM Type II, & GERD. He originally presented to his PCP 5/8/24 for a clinical presentation of low back pain with intitial work-up showing no acute findings but spondylosis and due to lack of clinical improvement presented to the ED with CT A/P significant for "focal narrowing and bowel wall thickening within the distance sigmoid colon/proximal rectum as well as focal thickening and pericolonic fat stranding in the descending colon. Also noted was mesenteric and retroperitoneal lymphadenopathy as well as diffuse heterogeneity of the liver with multiple focal regions of hypo attenuation." IR guided biopsy was performed on liver lesion and confirmed MSI-S Adenocarcinoma of Colon Primary based on positive CDX-2 staining. [place] [facility] [place] to the ED on 6/20/24 due to labs concerning for hyperbilirubinemia at 6.8. GI was consulted during that admission with MRCP performed with no indication for stenting with testing consistent with hyperbilirubinuria due to tumor burden. Treatment options were discussed including hospice given he extensive tumor burden and PS and was briefly discharged home on hospice but with plans changing to pursue treatment.     Past medical history: HTN, HLD, DM Type II, & GERD, Metastatic Colon Adenocarcinoma  Past surgical history:  N/A  Allergies: Aspirin  Social history: Denied ETOH, Tobacco, & Illicit drug use, not currently working due to debility  Hospitalizations: [place] [facility] [place]  Family history: Sister with thyroid cancer and adenoid cystic carcinoma. Mother with liver cancer thought to be from hepatitis " and another sister with unknown cancer.   Medications: As noted below    ECOG Performance status: 2 - Symptomatic, <50% confined to bed    Cancer Staging   No matching staging information was found for the patient.      Oncologic History:  Oncology History   Metastatic colon cancer to liver   6/24/2024 Initial Diagnosis    Metastatic colon cancer to liver     7/15/2024 -  Chemotherapy    Treatment Summary   Plan Name: OP GI [id number] (oxaliplatin leucovorin fluorouracil) Q2W  Treatment Goal: Palliative  Status: Active  Start Date: 7/15/2024 (Planned)  End Date: 12/18/2024 (Planned)  Provider: Usman Boateng MD  Chemotherapy: oxaliplatin (ELOXATIN) in D5W 500 mL chemo infusion, 75 mg/m2 (original dose ), Intravenous, Clinic/HOD 1 time, 0 of 12 cycles  Dose modification: 75 mg/m2 (Cycle 12)  fluorouracil chemo infusion, 2,000 mg/m2 (original dose ), Intravenous, Over 46 hours, 0 of 12 cycles  Dose modification: 2,000 mg/m2 (Cycle 12)         Review of Systems   Constitutional:  Positive for activity change, appetite change, fatigue and unexpected weight change.   Gastrointestinal:  Positive for abdominal distention and abdominal pain.   Musculoskeletal:  Positive for back pain.   Neurological:  Positive for weakness.       Allergies:  Review of patient's allergies indicates:   Allergen Reactions    Asa [aspirin]        Medications:  Current Outpatient Medications   Medication Sig Dispense Refill    atorvastatin (LIPITOR) 10 MG tablet Take 10 mg by mouth once daily.      cyclobenzaprine (FEXMID) 7.5 MG Tab TAKE 1 Tablet BY MOUTH THREE TIMES DAILY AS NEEDED FOR lower BACK pain      dicyclomine (BENTYL) 20 mg tablet TAKE 1 Tablet BY MOUTH FOUR TIMES DAILY FOR lower abdominal pain      ergocalciferol (ERGOCALCIFEROL) 50,000 unit Cap Take 1 capsule by mouth every 7 days. On Saturday.      hydrocortisone-pramoxine (PROCTOFOAM-HS) rectal foam Place 1 applicator rectally 2 (two) times daily.      LIDOcaine  (LIDODERM) 5 % Place 1 patch onto the skin once daily. To lower back.      losartan (COZAAR) 25 MG tablet Take 25 mg by mouth once daily.      metFORMIN (GLUCOPHAGE) 500 MG tablet Take 500 mg by mouth 2 (two) times daily with meals.      omeprazole (PRILOSEC) 20 MG capsule TAKE 1 Capsule BY MOUTH EVERY DAY 30 minutes TO 1 hour BEFORE A MEAL      morphine (MS CONTIN) 15 MG 12 hr tablet Take 1 tablet (15 mg total) by mouth 2 (two) times daily. 60 tablet 0    ondansetron (ZOFRAN) 8 MG tablet Take 1 tablet (8 mg total) by mouth every 8 (eight) hours as needed for Nausea. 60 tablet 2    oxyCODONE (ROXICODONE) 10 mg Tab immediate release tablet Take 1 tablet (10 mg total) by mouth every 4 (four) hours as needed for Pain. 150 tablet 0     No current facility-administered medications for this visit.       PMH:  No past medical history on file.    PSH:  No past surgical history on file.    FamHx:  No family history on file.    SocHx:  Social History     Socioeconomic History    Marital status:    Tobacco Use    Smoking status: Never    Smokeless tobacco: Never     Social Determinants of Health     Financial Resource Strain: Low Risk  (6/25/2024)    Overall Financial Resource Strain (CARDIA)     Difficulty of Paying Living Expenses: Not hard at all   Food Insecurity: No Food Insecurity (6/20/2024)    Received from [facility]    Hunger Vital Sign     Worried About Running Out of Food in the Last Year: Never true     Ran Out of Food in the Last Year: Never true   Transportation Needs: No Transportation Needs (6/20/2024)    Received from [facility]    PRAPARE - Transportation     Lack of Transportation (Medical): No     Lack of Transportation (Non-Medical): No   Physical Activity: Inactive (6/25/2024)    Exercise Vital Sign     Days of Exercise per Week: 0 days     Minutes of Exercise per Session: 0 min   Stress: Stress Concern Present (6/25/2024)    [employer] -  Occupational Stress Questionnaire     Feeling of Stress : Rather much       Distress Score    Distress Score: 5        Objective:      Vitals:    07/02/24 1011   BP: 120/80   BP Location: Right arm   Patient Position: Sitting   BP Method: Medium (Automatic)   Pulse: (!) 120   Temp: 98.5 °F (36.9 °C)   TempSrc: Oral   SpO2: 95%   Weight: 77.7 kg (171 lb 6.5 oz)     Physical Exam  Vitals reviewed. Exam conducted with a chaperone present.   Cardiovascular:      Rate and Rhythm: Normal rate and regular rhythm.      Pulses: Normal pulses.      Comments: Chest port in place with no redness or erythema.   Pulmonary:      Effort: Pulmonary effort is normal.   Abdominal:      General: There is distension.      Tenderness: There is abdominal tenderness.   Neurological:      General: No focal deficit present.      Mental Status: He is alert.         LABS:  WBC   Date Value Ref Range Status   06/24/2024 14.55 (H) 3.90 - 12.70 K/uL Final     Hemoglobin   Date Value Ref Range Status   06/24/2024 10.9 (L) 14.0 - 18.0 g/dL Final     Hematocrit   Date Value Ref Range Status   06/24/2024 32.9 (L) 40.0 - 54.0 % Final     Platelets   Date Value Ref Range Status   06/24/2024 402 150 - 450 K/uL Final       Chemistry        Component Value Date/Time     (L) 06/25/2024 1714    K 5.4 (H) 06/25/2024 1714    CL 95 06/25/2024 1714    CO2 22 (L) 06/25/2024 1714    BUN 14 06/25/2024 1714    CREATININE 0.7 06/25/2024 1714     (H) 06/25/2024 1714        Component Value Date/Time    CALCIUM 8.8 06/25/2024 1714    ALKPHOS 1,076 (H) 06/24/2024 0839     (H) 06/24/2024 0839     (H) 06/24/2024 0839    BILITOT 7.1 (H) 06/24/2024 0839    ESTGFRAFRICA 90 06/23/2022 0950          Pathology:    IR-Guided Liver Biopsy 5/30/24    Sections show core biopsies of liver with metastatic adenocarcinoma.    Irregular glandular/papillary structures are present. The tumor cells    show moderate to severe pleomorphism. Necrosis is present.  The clinical    information indicates malignant neoplasm of ascending colon. This    certainly could represent metastatic colon adenocarcinoma. CDX-2    immunostain is performed for corroboration, and the tumor cells are    strongly positive for CDX-2. The findings are consistent with    metastatic adenocarcinoma from the colon. The control works properly.     Addendum Report   Mismatch Repair Immunohistochemistry Testing: Immunohistochemical stains    for mismatch repair (MMR) proteins are performed with working controls    on block 1A and show the following:   MLH1: Intact nuclear expression   MSH2: Intact nuclear expression   MSH6: Intact nuclear expression   PMS2: Intact nuclear expression.       Imaging:    CT A/P 5/17/24    1.   Focal narrowing and bowel wall thickening within the distal sigmoid colon/proximal rectum, as well as focal thickening and pericolonic fat stranding in the descending colon, as above. Mesenteric and retroperitoneal lymphadenopathy. Findings are concerning for neoplastic process, including colonic adenocarcinoma.   2.   Diffuse heterogeneity of the liver with multiple focal regions of hypoattenuation, most suggestive of neoplastic metastatic disease given colonic findings.     PET-CT 6/5/24    1.   Primary proximal left colonic neoplasm with possible additional synchronous neoplasm along the sigmoid colon versus metastatic sigmoid soft tissue deposit/implant.   2.   Localized adenopathy along the mesenteric side of the primary left colonic mass.   3.   Metastatic soft tissue implant within the left the abdominal mesentery.   4.   Multistation metastatic locoregional adenopathy.   5.   Widespread hepatic metastases.   6.   No convincing evidence for intrathoracic metastatic disease. There are some small pulmonary nodules that are subthreshold in size to accurately characterize by PET/CT but can be surveilled on follow-up imaging.     CT A/P 6/24/24    Impression:     No convincing  evidence of pulmonary thromboembolism.     Primary colonic mass in the descending colon with local extension of disease along the mesenteric root and surrounding peritoneal soft tissue nodules/tumor deposits.     Extensive hepatic metastasis noting areas of intrahepatic biliary duct dilatation, most prominent in the left hepatic lobe.  Additional probable upper abdominal metastatic lymph nodes.     Mesenteric edema and trace ascites.     Scattered subcentimeter pulmonary nodules, nonspecific.     Additional findings as above.          Assessment:       1. Metastatic colon cancer to liver    2. Neoplasm related pain    3. Hyperbilirubinemia    4. Goals of care, counseling/discussion    5. Failure to thrive in adult          Plan:         Metastatic Colon Adenocarcinoma to Liver  [place] [facility] [place]-pain with work-up with CT A/P showing large descending colon mass and extensive hepatic involvement  Established care with [place]. Marte [place] [facility] with plans for C1D1 of palliative FOLFOX  Worsened hyperbilirubinemia evaluated at both [facility] and [facility] [place] with MRCP showing no stentable etiology of hyperbilirubinemia  Extensive hepatic tumor burden etiology of hepatic dysfunction  Plan:  -Discussed with patient and family extensively regarding this being an incurable malignancy and all offered treatment options being with palliative intent  -Patient is [place]. Marte [place] [facility] [place] plans for C1D1 of palliative FOLFOX on 7/3/24  -Patient and family wish to transition care to [facility] following C1  -Return to clinic in 2 weeks prior to C2  -Referral to [facility] palliative care   -Follow-up NGS testing obtained [place] [facility] [place] to guide possible 2nd line treatment    Hyperbilirubinemia  Secondary to hepatic tumor burden  Worsened hyperbilirubinemia evaluated at both [facility] and [facility] [place] with MRCP showing no stentable etiology of hyperbilirubinemia  Most recent bilirubin of 7.1 on  6/24/24  Plan:  -Following patient and family discussion, patient wishes to pursue palliative FOLFOX acknowleging possibility for worsened liver dysfunction  -CMP prior to each cycle  -Nothing stentable per GI colleagues  -Defer Irinotecan at this time, can re-assess based on clinical course    Cancer-Related Pain  Plan:  -Refilled MS Contin and Oxycodone at this visit  -Palliative care referral     Route Chart for Scheduling    Med Onc Chart Routing  Urgent    Follow up with physician 2 weeks. 2 weeks with Dr. Boateng on 7/16/24   Follow up with [facility]    Infusion scheduling note   C2 of FOLFOX starting on 7/16/24   Injection scheduling note    Labs CMP and CBC   Scheduling:  Preferred lab:  Lab interval:  CBC, CMP prior to next visit   Imaging    Pharmacy appointment    Other referrals       Additional referrals needed  Palliative care referral       Treatment Plan Information   OP GI mFOLFOX6 (oxaliplatin leucovorin fluorouracil) Q2W   sUman Boateng MD   Upcoming Treatment Dates - OP GI mFOLFOX6 (oxaliplatin leucovorin fluorouracil) Q2W    7/15/2024       Chemotherapy       oxaliplatin (ELOXATIN) in D5W 500 mL chemo infusion       fluorouracil chemo infusion       Antiemetics       palonosetron 0.25mg/dexAMETHasone 12mg in NS IVPB 0.25 mg 50 mL  7/29/2024       Chemotherapy       oxaliplatin (ELOXATIN) in D5W 500 mL chemo infusion       fluorouracil chemo infusion       Antiemetics       palonosetron 0.25mg/dexAMETHasone 12mg in NS IVPB 0.25 mg 50 mL  8/12/2024       Chemotherapy       oxaliplatin (ELOXATIN) in D5W 500 mL chemo infusion       fluorouracil chemo infusion       Antiemetics       palonosetron 0.25mg/dexAMETHasone 12mg in NS IVPB 0.25 mg 50 mL  8/26/2024       Chemotherapy       oxaliplatin (ELOXATIN) in D5W 500 mL chemo infusion       fluorouracil chemo infusion       Antiemetics       palonosetron 0.25mg/dexAMETHasone 12mg in NS IVPB 0.25 mg 50 mL                Khoa Narayan,  LORIN.  Hematology/Oncology Fellow, PGY-IV

## 2024-07-02 NOTE — TELEPHONE ENCOUNTER
Pt's niece reports bilateral leg swelling and pain x 2-3 days. Pt's niece reports edema has started to spread above knee. Recently dx with colon cancer. Pt saw MD today but did not address leg swelling with provider. Care advice for pt to be seen within 4 hours. Care advice given to pt's family member. Family member verbalizes understanding.   Reason for Disposition   SEVERE leg swelling (e.g., swelling extends above knee, entire leg is swollen, weeping fluid)    Additional Information   Negative: SEVERE difficulty breathing (e.g., struggling for each breath, speaks in single words)   Negative: Looks like a broken bone or dislocated joint (e.g., crooked or deformed)   Negative: Sounds like a life-threatening emergency to the triager   Negative: Difficulty breathing at rest   Negative: Entire foot is cool or blue in comparison to other side   Negative: [1] Can't walk or can barely walk AND [2] new-onset   Negative: [1] Difficulty breathing with exertion (e.g., walking) AND [2] new-onset or getting worse   Negative: [1] Red area or streak AND [2] fever   Negative: [1] Swelling is painful to touch AND [2] fever   Negative: [1] Cast on leg or ankle AND [2] now increased pain   Negative: Patient sounds very sick or weak to the triager    Protocols used: Leg Swelling and Edema-A-AH

## 2024-07-05 NOTE — TELEPHONE ENCOUNTER
Returned call to patient's niece.  Niece reports patient is doing much better and had [place] at [facility] [place].  Doctor at [facility] [place] told patient to take a bath and swelling went down.  Shared message with MD and Dr. Boateng said patient could also elevate legs to help reduce swelling.  Niece states that patient tried but it is difficult for him to do this as it's uncomfortable.  Patient swelling is significantly decreased per niece and patient is up and walking around.  No further needs at this time.  Instructed alecia to return call to clinic if further needs arise and she verbalized understanding.

## 2024-07-07 ENCOUNTER — PATIENT MESSAGE (OUTPATIENT)
Dept: HEMATOLOGY/ONCOLOGY | Facility: CLINIC | Age: 59
End: 2024-07-07
Payer: MEDICAID

## 2024-07-08 ENCOUNTER — TELEPHONE (OUTPATIENT)
Dept: PALLIATIVE MEDICINE | Facility: CLINIC | Age: 59
End: 2024-07-08
Payer: MEDICAID

## 2024-07-08 NOTE — TELEPHONE ENCOUNTER
An referral was placed for patient to be seen in palliative medicine left voicemail and provided clinic number for patient to call back (sched with segun )